# Patient Record
Sex: MALE | Race: WHITE | NOT HISPANIC OR LATINO
[De-identification: names, ages, dates, MRNs, and addresses within clinical notes are randomized per-mention and may not be internally consistent; named-entity substitution may affect disease eponyms.]

---

## 2017-11-27 ENCOUNTER — APPOINTMENT (OUTPATIENT)
Dept: SURGERY | Facility: CLINIC | Age: 52
End: 2017-11-27
Payer: MEDICAID

## 2017-11-27 VITALS — WEIGHT: 255.4 LBS | BODY MASS INDEX: 29.85 KG/M2 | HEIGHT: 77.5 IN

## 2017-11-27 PROCEDURE — 99213 OFFICE O/P EST LOW 20 MIN: CPT

## 2017-11-27 RX ORDER — ALPRAZOLAM 1 MG/1
1 TABLET ORAL DAILY
Refills: 0 | Status: ACTIVE | COMMUNITY

## 2017-11-27 RX ORDER — QUETIAPINE 400 MG/1
400 TABLET, FILM COATED ORAL
Refills: 0 | Status: ACTIVE | COMMUNITY

## 2017-12-22 ENCOUNTER — OUTPATIENT (OUTPATIENT)
Dept: OUTPATIENT SERVICES | Facility: HOSPITAL | Age: 52
LOS: 1 days | Discharge: HOME | End: 2017-12-22

## 2017-12-22 DIAGNOSIS — E66.01 MORBID (SEVERE) OBESITY DUE TO EXCESS CALORIES: ICD-10-CM

## 2017-12-22 DIAGNOSIS — G47.30 SLEEP APNEA, UNSPECIFIED: ICD-10-CM

## 2017-12-22 DIAGNOSIS — K21.9 GASTRO-ESOPHAGEAL REFLUX DISEASE WITHOUT ESOPHAGITIS: ICD-10-CM

## 2017-12-22 DIAGNOSIS — I10 ESSENTIAL (PRIMARY) HYPERTENSION: ICD-10-CM

## 2017-12-22 DIAGNOSIS — E11.9 TYPE 2 DIABETES MELLITUS WITHOUT COMPLICATIONS: ICD-10-CM

## 2017-12-22 DIAGNOSIS — K56.609 UNSPECIFIED INTESTINAL OBSTRUCTION, UNSPECIFIED AS TO PARTIAL VERSUS COMPLETE OBSTRUCTION: ICD-10-CM

## 2018-01-02 ENCOUNTER — APPOINTMENT (OUTPATIENT)
Dept: SURGERY | Facility: CLINIC | Age: 53
End: 2018-01-02
Payer: MEDICAID

## 2018-01-02 VITALS — WEIGHT: 256 LBS | HEIGHT: 76.5 IN | BODY MASS INDEX: 30.85 KG/M2

## 2018-01-02 DIAGNOSIS — F31.9 BIPOLAR DISORDER, UNSPECIFIED: ICD-10-CM

## 2018-01-02 DIAGNOSIS — K21.9 GASTRO-ESOPHAGEAL REFLUX DISEASE W/OUT ESOPHAGITIS: ICD-10-CM

## 2018-01-02 PROCEDURE — 99213 OFFICE O/P EST LOW 20 MIN: CPT

## 2018-01-02 RX ORDER — UBIDECARENONE 100 MG
100 CAPSULE ORAL
Refills: 0 | Status: ACTIVE | COMMUNITY

## 2018-01-02 RX ORDER — MULTIVITAMIN WITH IRON
TABLET ORAL DAILY
Refills: 0 | Status: ACTIVE | COMMUNITY

## 2018-01-18 ENCOUNTER — RESULT REVIEW (OUTPATIENT)
Age: 53
End: 2018-01-18

## 2018-01-23 ENCOUNTER — OUTPATIENT (OUTPATIENT)
Dept: OUTPATIENT SERVICES | Facility: HOSPITAL | Age: 53
LOS: 1 days | Discharge: HOME | End: 2018-01-23

## 2018-01-23 DIAGNOSIS — L97.411 NON-PRESSURE CHRONIC ULCER OF RIGHT HEEL AND MIDFOOT LIMITED TO BREAKDOWN OF SKIN: ICD-10-CM

## 2018-02-04 DIAGNOSIS — K56.609 UNSPECIFIED INTESTINAL OBSTRUCTION, UNSPECIFIED AS TO PARTIAL VERSUS COMPLETE OBSTRUCTION: ICD-10-CM

## 2018-02-04 DIAGNOSIS — E11.9 TYPE 2 DIABETES MELLITUS WITHOUT COMPLICATIONS: ICD-10-CM

## 2018-02-04 DIAGNOSIS — G47.30 SLEEP APNEA, UNSPECIFIED: ICD-10-CM

## 2018-02-04 DIAGNOSIS — E66.01 MORBID (SEVERE) OBESITY DUE TO EXCESS CALORIES: ICD-10-CM

## 2018-02-04 DIAGNOSIS — I10 ESSENTIAL (PRIMARY) HYPERTENSION: ICD-10-CM

## 2018-03-09 ENCOUNTER — EMERGENCY (EMERGENCY)
Facility: HOSPITAL | Age: 53
LOS: 0 days | Discharge: HOME | End: 2018-03-10

## 2018-03-09 VITALS
TEMPERATURE: 99 F | OXYGEN SATURATION: 98 % | SYSTOLIC BLOOD PRESSURE: 130 MMHG | DIASTOLIC BLOOD PRESSURE: 92 MMHG | HEART RATE: 66 BPM | RESPIRATION RATE: 17 BRPM
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
## 2018-03-09 DIAGNOSIS — Z98.84 BARIATRIC SURGERY STATUS: ICD-10-CM

## 2018-03-09 DIAGNOSIS — R25.1 TREMOR, UNSPECIFIED: ICD-10-CM
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
## 2018-03-09 DIAGNOSIS — F31.9 BIPOLAR DISORDER, UNSPECIFIED: ICD-10-CM
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
## 2018-03-10 VITALS
DIASTOLIC BLOOD PRESSURE: 90 MMHG | RESPIRATION RATE: 17 BRPM | HEART RATE: 64 BPM | SYSTOLIC BLOOD PRESSURE: 138 MMHG | OXYGEN SATURATION: 98 %

## 2018-03-10 DIAGNOSIS — Z98.890 OTHER SPECIFIED POSTPROCEDURAL STATES: Chronic | ICD-10-CM

## 2018-03-10 NOTE — ED PROVIDER NOTE - PHYSICAL EXAMINATION
Constitutional: Well developed, well nourished. NAD  Head: Normocephalic, atraumatic.  Eyes: PERRL, EOMI.  ENT: No nasal discharge. Mucous membranes dry.  Neck: Supple. Painless ROM.  Cardiovascular: Normal S1, S2. Regular rate and rhythm. No murmurs, rubs, or gallops.  Pulmonary: Normal respiratory rate and effort. Lungs clear to auscultation bilaterally. No wheezing, rales, or rhonchi.  Abdominal: Soft. Nondistended. No rebound, guarding, rigidity.  Extremities. Pelvis stable. No lower extremity edema, symmetric calves.  Skin: No rashes, cyanosis.  Neuro: AAOx3. No focal neurological deficits.  Psych: Normal mood. Normal affect.

## 2018-03-10 NOTE — ED PROVIDER NOTE - NS ED ROS FT
Constitutional: No fever, chills. + generalized shaking after incident resolved now  Eyes: No visual changes.  ENT: No hearing changes.  Neck: No neck pain or stiffness.  Cardiovascular: No chest pain, palpitations, edema.  Pulmonary: No SOB, cough. No hemoptysis.  Abdominal:  No nausea, vomiting, diarrhea.  : No dysuria, frequency.  Neuro: No headache, syncope, dizziness.  MS: No back pain. No calf pain/swelling.  Psych: No suicidal ideations.
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
## 2018-10-01 ENCOUNTER — APPOINTMENT (OUTPATIENT)
Dept: SURGERY | Facility: CLINIC | Age: 53
End: 2018-10-01

## 2018-10-24 ENCOUNTER — OTHER (OUTPATIENT)
Age: 53
End: 2018-10-24

## 2018-11-16 ENCOUNTER — OTHER (OUTPATIENT)
Age: 53
End: 2018-11-16

## 2018-12-11 ENCOUNTER — OTHER (OUTPATIENT)
Age: 53
End: 2018-12-11

## 2019-01-17 ENCOUNTER — INPATIENT (INPATIENT)
Facility: HOSPITAL | Age: 54
LOS: 1 days | Discharge: HOME HEALTH | End: 2019-01-19
Attending: INTERNAL MEDICINE | Admitting: INTERNAL MEDICINE

## 2019-01-17 VITALS
OXYGEN SATURATION: 94 % | TEMPERATURE: 99 F | HEART RATE: 99 BPM | SYSTOLIC BLOOD PRESSURE: 126 MMHG | DIASTOLIC BLOOD PRESSURE: 80 MMHG | RESPIRATION RATE: 20 BRPM

## 2019-01-17 DIAGNOSIS — J98.11 ATELECTASIS: ICD-10-CM

## 2019-01-17 DIAGNOSIS — Z96.642 PRESENCE OF LEFT ARTIFICIAL HIP JOINT: ICD-10-CM

## 2019-01-17 DIAGNOSIS — R09.02 HYPOXEMIA: ICD-10-CM

## 2019-01-17 DIAGNOSIS — I10 ESSENTIAL (PRIMARY) HYPERTENSION: ICD-10-CM

## 2019-01-17 DIAGNOSIS — F31.9 BIPOLAR DISORDER, UNSPECIFIED: ICD-10-CM

## 2019-01-17 DIAGNOSIS — Z96.642 PRESENCE OF LEFT ARTIFICIAL HIP JOINT: Chronic | ICD-10-CM

## 2019-01-17 DIAGNOSIS — K21.9 GASTRO-ESOPHAGEAL REFLUX DISEASE WITHOUT ESOPHAGITIS: ICD-10-CM

## 2019-01-17 DIAGNOSIS — Z98.890 OTHER SPECIFIED POSTPROCEDURAL STATES: Chronic | ICD-10-CM

## 2019-01-17 DIAGNOSIS — R06.02 SHORTNESS OF BREATH: ICD-10-CM

## 2019-01-17 LAB
ANION GAP SERPL CALC-SCNC: 11 MMOL/L — SIGNIFICANT CHANGE UP (ref 7–14)
APPEARANCE UR: CLEAR — SIGNIFICANT CHANGE UP
APTT BLD: 29.1 SEC — SIGNIFICANT CHANGE UP (ref 27–39.2)
BASOPHILS # BLD AUTO: 0.04 K/UL — SIGNIFICANT CHANGE UP (ref 0–0.2)
BASOPHILS NFR BLD AUTO: 0.7 % — SIGNIFICANT CHANGE UP (ref 0–1)
BILIRUB UR-MCNC: NEGATIVE — SIGNIFICANT CHANGE UP
BUN SERPL-MCNC: 12 MG/DL — SIGNIFICANT CHANGE UP (ref 10–20)
CALCIUM SERPL-MCNC: 8.6 MG/DL — SIGNIFICANT CHANGE UP (ref 8.5–10.1)
CHLORIDE SERPL-SCNC: 102 MMOL/L — SIGNIFICANT CHANGE UP (ref 98–110)
CO2 SERPL-SCNC: 29 MMOL/L — SIGNIFICANT CHANGE UP (ref 17–32)
COLOR SPEC: YELLOW — SIGNIFICANT CHANGE UP
CREAT SERPL-MCNC: 0.9 MG/DL — SIGNIFICANT CHANGE UP (ref 0.7–1.5)
DIFF PNL FLD: NEGATIVE — SIGNIFICANT CHANGE UP
EOSINOPHIL # BLD AUTO: 0.1 K/UL — SIGNIFICANT CHANGE UP (ref 0–0.7)
EOSINOPHIL NFR BLD AUTO: 1.8 % — SIGNIFICANT CHANGE UP (ref 0–8)
GLUCOSE BLDC GLUCOMTR-MCNC: 101 MG/DL — HIGH (ref 70–99)
GLUCOSE SERPL-MCNC: 129 MG/DL — HIGH (ref 70–99)
GLUCOSE UR QL: NEGATIVE MG/DL — SIGNIFICANT CHANGE UP
HCT VFR BLD CALC: 40.4 % — LOW (ref 42–52)
HGB BLD-MCNC: 13.5 G/DL — LOW (ref 14–18)
IMM GRANULOCYTES NFR BLD AUTO: 0.4 % — HIGH (ref 0.1–0.3)
INR BLD: 1.13 RATIO — SIGNIFICANT CHANGE UP (ref 0.65–1.3)
KETONES UR-MCNC: NEGATIVE — SIGNIFICANT CHANGE UP
LACTATE SERPL-SCNC: 1.8 MMOL/L — SIGNIFICANT CHANGE UP (ref 0.5–2.2)
LEUKOCYTE ESTERASE UR-ACNC: NEGATIVE — SIGNIFICANT CHANGE UP
LYMPHOCYTES # BLD AUTO: 0.83 K/UL — LOW (ref 1.2–3.4)
LYMPHOCYTES # BLD AUTO: 14.8 % — LOW (ref 20.5–51.1)
MCHC RBC-ENTMCNC: 31.3 PG — HIGH (ref 27–31)
MCHC RBC-ENTMCNC: 33.4 G/DL — SIGNIFICANT CHANGE UP (ref 32–37)
MCV RBC AUTO: 93.7 FL — SIGNIFICANT CHANGE UP (ref 80–94)
MONOCYTES # BLD AUTO: 0.38 K/UL — SIGNIFICANT CHANGE UP (ref 0.1–0.6)
MONOCYTES NFR BLD AUTO: 6.8 % — SIGNIFICANT CHANGE UP (ref 1.7–9.3)
NEUTROPHILS # BLD AUTO: 4.23 K/UL — SIGNIFICANT CHANGE UP (ref 1.4–6.5)
NEUTROPHILS NFR BLD AUTO: 75.5 % — HIGH (ref 42.2–75.2)
NITRITE UR-MCNC: NEGATIVE — SIGNIFICANT CHANGE UP
NRBC # BLD: 0 /100 WBCS — SIGNIFICANT CHANGE UP (ref 0–0)
NT-PROBNP SERPL-SCNC: 88 PG/ML — SIGNIFICANT CHANGE UP (ref 0–300)
PH UR: 6 — SIGNIFICANT CHANGE UP (ref 5–8)
PLATELET # BLD AUTO: 172 K/UL — SIGNIFICANT CHANGE UP (ref 130–400)
POTASSIUM SERPL-MCNC: 4.4 MMOL/L — SIGNIFICANT CHANGE UP (ref 3.5–5)
POTASSIUM SERPL-SCNC: 4.4 MMOL/L — SIGNIFICANT CHANGE UP (ref 3.5–5)
PROT UR-MCNC: NEGATIVE MG/DL — SIGNIFICANT CHANGE UP
PROTHROM AB SERPL-ACNC: 13 SEC — HIGH (ref 9.95–12.87)
RBC # BLD: 4.31 M/UL — LOW (ref 4.7–6.1)
RBC # FLD: 12.6 % — SIGNIFICANT CHANGE UP (ref 11.5–14.5)
SODIUM SERPL-SCNC: 142 MMOL/L — SIGNIFICANT CHANGE UP (ref 135–146)
SP GR SPEC: 1.01 — SIGNIFICANT CHANGE UP (ref 1.01–1.03)
TROPONIN T SERPL-MCNC: <0.01 NG/ML — SIGNIFICANT CHANGE UP
UROBILINOGEN FLD QL: 1 MG/DL (ref 0.2–0.2)
WBC # BLD: 5.6 K/UL — SIGNIFICANT CHANGE UP (ref 4.8–10.8)
WBC # FLD AUTO: 5.6 K/UL — SIGNIFICANT CHANGE UP (ref 4.8–10.8)

## 2019-01-17 RX ORDER — QUETIAPINE FUMARATE 200 MG/1
300 TABLET, FILM COATED ORAL AT BEDTIME
Qty: 0 | Refills: 0 | Status: DISCONTINUED | OUTPATIENT
Start: 2019-01-17 | End: 2019-01-19

## 2019-01-17 RX ORDER — VANCOMYCIN HCL 1 G
1500 VIAL (EA) INTRAVENOUS ONCE
Qty: 0 | Refills: 0 | Status: COMPLETED | OUTPATIENT
Start: 2019-01-17 | End: 2019-01-17

## 2019-01-17 RX ORDER — SODIUM CHLORIDE 9 MG/ML
3 INJECTION INTRAMUSCULAR; INTRAVENOUS; SUBCUTANEOUS ONCE
Qty: 0 | Refills: 0 | Status: COMPLETED | OUTPATIENT
Start: 2019-01-17 | End: 2019-01-17

## 2019-01-17 RX ORDER — QUETIAPINE FUMARATE 200 MG/1
0 TABLET, FILM COATED ORAL
Qty: 0 | Refills: 0 | COMMUNITY

## 2019-01-17 RX ORDER — PANTOPRAZOLE SODIUM 20 MG/1
40 TABLET, DELAYED RELEASE ORAL
Qty: 0 | Refills: 0 | Status: DISCONTINUED | OUTPATIENT
Start: 2019-01-17 | End: 2019-01-19

## 2019-01-17 RX ORDER — CHLORHEXIDINE GLUCONATE 213 G/1000ML
1 SOLUTION TOPICAL
Qty: 0 | Refills: 0 | Status: DISCONTINUED | OUTPATIENT
Start: 2019-01-17 | End: 2019-01-19

## 2019-01-17 RX ORDER — FUROSEMIDE 40 MG
20 TABLET ORAL DAILY
Qty: 0 | Refills: 0 | Status: DISCONTINUED | OUTPATIENT
Start: 2019-01-17 | End: 2019-01-19

## 2019-01-17 RX ORDER — VANCOMYCIN HCL 1 G
1500 VIAL (EA) INTRAVENOUS EVERY 12 HOURS
Qty: 0 | Refills: 0 | Status: DISCONTINUED | OUTPATIENT
Start: 2019-01-18 | End: 2019-01-19

## 2019-01-17 RX ORDER — ASPIRIN/CALCIUM CARB/MAGNESIUM 324 MG
1 TABLET ORAL
Qty: 0 | Refills: 0 | COMMUNITY

## 2019-01-17 RX ORDER — CEFEPIME 1 G/1
2000 INJECTION, POWDER, FOR SOLUTION INTRAMUSCULAR; INTRAVENOUS EVERY 8 HOURS
Qty: 0 | Refills: 0 | Status: DISCONTINUED | OUTPATIENT
Start: 2019-01-17 | End: 2019-01-19

## 2019-01-17 RX ORDER — ENOXAPARIN SODIUM 100 MG/ML
40 INJECTION SUBCUTANEOUS EVERY 24 HOURS
Qty: 0 | Refills: 0 | Status: DISCONTINUED | OUTPATIENT
Start: 2019-01-17 | End: 2019-01-19

## 2019-01-17 RX ORDER — TRAMADOL HYDROCHLORIDE 50 MG/1
25 TABLET ORAL THREE TIMES A DAY
Qty: 0 | Refills: 0 | Status: DISCONTINUED | OUTPATIENT
Start: 2019-01-17 | End: 2019-01-19

## 2019-01-17 RX ORDER — ASPIRIN/CALCIUM CARB/MAGNESIUM 324 MG
81 TABLET ORAL
Qty: 0 | Refills: 0 | Status: DISCONTINUED | OUTPATIENT
Start: 2019-01-17 | End: 2019-01-19

## 2019-01-17 RX ORDER — VANCOMYCIN HCL 1 G
VIAL (EA) INTRAVENOUS
Qty: 0 | Refills: 0 | Status: DISCONTINUED | OUTPATIENT
Start: 2019-01-17 | End: 2019-01-19

## 2019-01-17 RX ORDER — ACETAMINOPHEN 500 MG
975 TABLET ORAL ONCE
Qty: 0 | Refills: 0 | Status: COMPLETED | OUTPATIENT
Start: 2019-01-17 | End: 2019-01-17

## 2019-01-17 RX ORDER — CEFEPIME 1 G/1
2000 INJECTION, POWDER, FOR SOLUTION INTRAMUSCULAR; INTRAVENOUS ONCE
Qty: 0 | Refills: 0 | Status: COMPLETED | OUTPATIENT
Start: 2019-01-17 | End: 2019-01-17

## 2019-01-17 RX ORDER — ACETAMINOPHEN 500 MG
650 TABLET ORAL EVERY 6 HOURS
Qty: 0 | Refills: 0 | Status: DISCONTINUED | OUTPATIENT
Start: 2019-01-17 | End: 2019-01-19

## 2019-01-17 RX ADMIN — SODIUM CHLORIDE 3 MILLILITER(S): 9 INJECTION INTRAMUSCULAR; INTRAVENOUS; SUBCUTANEOUS at 10:57

## 2019-01-17 RX ADMIN — CEFEPIME 100 MILLIGRAM(S): 1 INJECTION, POWDER, FOR SOLUTION INTRAMUSCULAR; INTRAVENOUS at 11:18

## 2019-01-17 RX ADMIN — Medication 975 MILLIGRAM(S): at 11:18

## 2019-01-17 RX ADMIN — Medication 20 MILLIGRAM(S): at 15:09

## 2019-01-17 RX ADMIN — CEFEPIME 100 MILLIGRAM(S): 1 INJECTION, POWDER, FOR SOLUTION INTRAMUSCULAR; INTRAVENOUS at 22:18

## 2019-01-17 RX ADMIN — Medication 81 MILLIGRAM(S): at 17:06

## 2019-01-17 RX ADMIN — QUETIAPINE FUMARATE 300 MILLIGRAM(S): 200 TABLET, FILM COATED ORAL at 21:47

## 2019-01-17 RX ADMIN — Medication 975 MILLIGRAM(S): at 14:12

## 2019-01-17 RX ADMIN — Medication 500 MILLIGRAM(S): at 12:50

## 2019-01-17 RX ADMIN — ENOXAPARIN SODIUM 40 MILLIGRAM(S): 100 INJECTION SUBCUTANEOUS at 15:08

## 2019-01-17 NOTE — ED ADULT NURSE REASSESSMENT NOTE - NS ED NURSE REASSESS COMMENT FT1
Pt returned from CT with increased chills, temp taken orally 101.7F, MD Hall made aware and will order PO tylenol. Will reassess.

## 2019-01-17 NOTE — ED PROVIDER NOTE - OBJECTIVE STATEMENT
52 y/o male h/o HTN, Bipolar d/o, GERD, s/p umbilical hernia repair, Gregg-en-Y at [facility] 2013, hip replacement yesterday at [facility] (Dr. Hardeep Welch), now presents wit SOB since waking at 0530 AM. Sx are constant, now slightly improved but not resolved, denies radiation, worse with exertion and lying down, better at rest and sitting up, associated cough, states is compliant with home medications, denies fever, hemoptysis, PND, chest pain, LE pain or swelling, recent travel or other associated complaints at present. Pt reports similar sx previously with his GERD but usually respond to sx treatment at home and this episode did not resolve. 54 y/o male h/o HTN, Bipolar d/o, GERD, s/p umbilical hernia repair, Gregg-en-Y at [facility] 2013, left hip replacement yesterday at [facility] (Dr. Hardeep Welch), now presents wit SOB since waking at 0530 AM. Sx are constant, now slightly improved but not resolved, denies radiation, worse with exertion and lying down, better at rest and sitting up, associated cough, states is compliant with home medications, denies fever, hemoptysis, PND, chest pain, LE pain or swelling, recent travel or other associated complaints at present. Pt reports similar sx previously with his GERD but usually respond to sx treatment at home and this episode did not resolve.

## 2019-01-17 NOTE — H&P ADULT - ATTENDING COMMENTS
HPI as above.  Pt seen and examined at bedside independently  Vitals stable  Exam stable  Labs reviewed  EKG reviewed  Imaging reviewed  Plan continue current management as above with edits. Addendums by Attending in bold and italics  #Handoff:  Pending: ID consult, fu resp antonette  Family discussion: mag pt at bedside   Dispo: home once medically cleared

## 2019-01-17 NOTE — ED PROVIDER NOTE - CARE PLAN
Principal Discharge DX:	Pneumonia  Secondary Diagnosis:	Hypoxia  Secondary Diagnosis:	Shortness of breath

## 2019-01-17 NOTE — H&P ADULT - NSHPLABSRESULTS_GEN_ALL_CORE
13.5   5.60  )-----------( 172      ( 2019 07:33 )             40.4           142  |  102  |  12  ----------------------------<  129<H>  4.4   |  29  |  0.9    Ca    8.6      2019 07:33          Urinalysis Basic - ( 2019 08:41 )    Color: Yellow / Appearance: Clear / S.010 / pH: x  Gluc: x / Ketone: Negative  / Bili: Negative / Urobili: 1.0 mg/dL   Blood: x / Protein: Negative mg/dL / Nitrite: Negative   Leuk Esterase: Negative / RBC: x / WBC x   Sq Epi: x / Non Sq Epi: x / Bacteria: x        PT/INR - ( 2019 07:33 )   PT: 13.00 sec;   INR: 1.13 ratio         PTT - ( 2019 07:33 )  PTT:29.1 sec    Lactate Trend   @ 07:33 Lactate:1.8       CARDIAC MARKERS ( 2019 07:33 )  x     / <0.01 ng/mL / x     / x     / x          < from: CT Angio Chest w/ IV Cont (19 @ 10:00) >    IMPRESSION:  No pulmonary emboli.    Bilateral groundglass nodular opacities, with lower lobe predominance.    Findings are likely infectious/postinflammatory in etiology.     Prominent mediastinal lymph nodes, likely reactive in nature.    < end of copied text >    < from: Xray Chest 1 View AP/PA (19 @ 08:06) >    Impression:      No radiographic evidence of acute cardiopulmonary disease.    < end of copied text >

## 2019-01-17 NOTE — ED PROVIDER NOTE - PHYSICAL EXAMINATION
VSS, awake, alert, non-toxic appearing, sitting comfortably, in NAD, ears clear, oropharynx clear, no skin rash or lesions, no tracheal deviation, non-labored breathing without accessory muscle use apparent or pursed lip breathing, no retractions, speaks full sentences, chest CTAB, no w/r/r, +S1/S2, RRR, no m/r/g, abdomen soft, NT, ND, +BS, No peripheral edema or ttp, AO x 3, clear speech.

## 2019-01-17 NOTE — ED PROVIDER NOTE - PROGRESS NOTE DETAILS
Pt's orthopedist paged, awaiting call back, pt notified of results prefers to be admitted to [facility], mildly hypoxic, responsive to oxygen, febrile, CT c/w early pna and pt immobile due to recent surgery.
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
## 2019-01-17 NOTE — ED PROVIDER NOTE - NS ED ROS FT
Constitutional: No fever, chills.  Eyes: No visual changes, eye pain or discharge.  ENMT: No hearing changes, pain, discharge or infections.  Respiratory: No respiratory distress.   GI: No nausea, vomiting, diarrhea or abdominal pain.  : No dysuria, frequency or burning.  MS: No myalgia, muscle weakness, joint pain or back pain.  Neuro: No headache or weakness. No LOC.  Skin: No skin rash.   Except as documented in the HPI, all other systems are negative.

## 2019-01-17 NOTE — H&P ADULT - NSHPREVIEWOFSYSTEMS_GEN_ALL_CORE
REVIEW OF SYSTEMS:    CONSTITUTIONAL: No weakness, fevers or chills  EYES/ENT: No visual changes;  No vertigo or throat pain   NECK: No pain or stiffness  RESPIRATORY: See HPI  CARDIOVASCULAR: No chest pain or palpitations  GASTROINTESTINAL: No abdominal or epigastric pain. No nausea, vomiting, or hematemesis; No diarrhea or constipation. No melena or hematochezia.  GENITOURINARY: No dysuria, frequency or hematuria  NEUROLOGICAL: No numbness or weakness  MUSCULOSKELETAL: No muscle or joint pain, stiffness, or swelling  SKIN: No itching, rashes

## 2019-01-17 NOTE — H&P ADULT - HISTORY OF PRESENT ILLNESS
This is a 52 YO M, PMH of HTN, Bipolar d/o, GERD, s/p umbilical hernia repair, Gregg-en-Y at [facility] 2013, left hip replacement yesterday at [facility] (Dr. Hardeep Welch), now presents wit SOB since waking at 0530 AM    Vital Signs Last 24 Hrs  T(C): 38.7 (17 Jan 2019 10:50), Max: 38.7 (17 Jan 2019 10:50)  T(F): 101.7 (17 Jan 2019 10:50), Max: 101.7 (17 Jan 2019 10:50)  HR: 93 (17 Jan 2019 07:55) (80 - 99)  BP: 126/80 (17 Jan 2019 07:09) (126/80 - 126/80)  BP(mean): --  RR: 20 (17 Jan 2019 07:55) (20 - 20)  SpO2: 97% (17 Jan 2019 07:55) (93% - 97%) This is a 52 YO M, PMH of HTN, Bipolar d/o, GERD, s/p umbilical hernia repair, Gregg-en-Y at [facility] 2013, left hip replacement yesterday at [facility] (Dr. Hardeep Welch), now presents with cough and SOB since waking at 0530 AM. Denies chest pain, N/V/D, abdominal pain, urinary symptoms, LE pain/swelling other than hip pain 2/2 surgery. In the ED was found to have fever, was hypoxic on RA.     Vital Signs Last 24 Hrs  T(C): 38.7 (17 Jan 2019 10:50), Max: 38.7 (17 Jan 2019 10:50)  T(F): 101.7 (17 Jan 2019 10:50), Max: 101.7 (17 Jan 2019 10:50)  HR: 93 (17 Jan 2019 07:55) (80 - 99)  BP: 126/80 (17 Jan 2019 07:09) (126/80 - 126/80)  BP(mean): --  RR: 20 (17 Jan 2019 07:55) (20 - 20)  SpO2: 97% (17 Jan 2019 07:55) (93% - 97%)

## 2019-01-17 NOTE — ED ADULT NURSE NOTE - NSFALLRSKHRMRISKTYPE_ED_ALL_ED
surgery(72hr postop, recent leg amputee, sig. abd/thor surg)/bones(Osteoporosis,prev fx,steroid use,metastatic bone ca)/other

## 2019-01-17 NOTE — ED ADULT NURSE NOTE - NSIMPLEMENTINTERV_GEN_ALL_ED
Implemented All Fall with Harm Risk Interventions:  [place] to call system. Call bell, personal items and telephone within reach. Instruct patient to call for assistance. Room bathroom lighting operational. Non-slip footwear when patient is off stretcher. Physically safe environment: no spills, clutter or unnecessary equipment. Stretcher in lowest position, wheels locked, appropriate side rails in place. Provide visual cue, wrist band, yellow gown, etc. Monitor gait and stability. Monitor for mental status changes and reorient to person, place, and time. Review medications for side effects contributing to fall risk. Reinforce activity limits and safety measures with patient and family. Provide visual clues: red socks.

## 2019-01-17 NOTE — H&P ADULT - ASSESSMENT
53 M with PMH HTN, GERD, bipolar Do presents with cough and SOB after hip replacement surgery. Was febrile and hypoxic in the ED, found to have opacity on CT chest as above. Will treat for HCAP given recent hospitalization and surgery.    1) Fever, cough, SOB likely 2/2 pneumonia  - O2 via NC PRN  - c/w broad spectrum ABx for now  - FU strep, MRSA, Legionella ag  - FU ID c/s    2) HTN  - c/w Lasix 20mg daily (home med)    3) Bipolar:  - c/w seroquel 300mg at bedtime    4) Recent hip replacement  - pain control with tramadol    PPx/Diet/Dispo  On ASA BID from ortho and SCDs  DASH diet  Full code, from home 53 M with PMH HTN, GERD, bipolar Do presents with cough and SOB after hip replacement surgery. Was febrile and hypoxic in the ED, found to have opacity on CT chest as above. Will treat for HCAP given recent hospitalization and surgery.    1) Fever, cough, SOB likely 2/2 suspected gram negative pneumonia  -Lobar opacity seen on Bilateral groundglass nodular opacities  - O2 via NC PRN  - c/w broad spectrum ABx for now  - FU strep, MRSA, Legionella ag  - FU ID c/s    2) HTN  - c/w Lasix 20mg daily (home med)    3) Bipolar:  - c/w seroquel 300mg at bedtime    4) Recent hip replacement  - pain control with tramadol    PPx/Diet/Dispo  On ASA BID from ortho and SCDs  DASH diet  Full code, from home

## 2019-01-18 RX ADMIN — CEFEPIME 100 MILLIGRAM(S): 1 INJECTION, POWDER, FOR SOLUTION INTRAMUSCULAR; INTRAVENOUS at 05:21

## 2019-01-18 RX ADMIN — QUETIAPINE FUMARATE 300 MILLIGRAM(S): 200 TABLET, FILM COATED ORAL at 21:52

## 2019-01-18 RX ADMIN — Medication 250 MILLIGRAM(S): at 17:25

## 2019-01-18 RX ADMIN — Medication 81 MILLIGRAM(S): at 17:25

## 2019-01-18 RX ADMIN — PANTOPRAZOLE SODIUM 40 MILLIGRAM(S): 20 TABLET, DELAYED RELEASE ORAL at 05:22

## 2019-01-18 RX ADMIN — CHLORHEXIDINE GLUCONATE 1 APPLICATION(S): 213 SOLUTION TOPICAL at 05:24

## 2019-01-18 RX ADMIN — CEFEPIME 100 MILLIGRAM(S): 1 INJECTION, POWDER, FOR SOLUTION INTRAMUSCULAR; INTRAVENOUS at 21:52

## 2019-01-18 RX ADMIN — Medication 81 MILLIGRAM(S): at 06:06

## 2019-01-18 RX ADMIN — Medication 20 MILLIGRAM(S): at 05:22

## 2019-01-18 RX ADMIN — Medication 75 MILLIGRAM(S): at 17:25

## 2019-01-18 RX ADMIN — CEFEPIME 100 MILLIGRAM(S): 1 INJECTION, POWDER, FOR SOLUTION INTRAMUSCULAR; INTRAVENOUS at 14:00

## 2019-01-18 RX ADMIN — Medication 75 MILLIGRAM(S): at 11:48

## 2019-01-18 RX ADMIN — ENOXAPARIN SODIUM 40 MILLIGRAM(S): 100 INJECTION SUBCUTANEOUS at 14:00

## 2019-01-18 RX ADMIN — Medication 250 MILLIGRAM(S): at 05:21

## 2019-01-18 NOTE — PROGRESS NOTE ADULT - SUBJECTIVE AND OBJECTIVE BOX
SUBJECTIVE:    Patient is a 53y old Male who presents with a chief complaint of SOB (2019 16:06)    Stable, no acute events.    PAST MEDICAL & SURGICAL HISTORY  Hypertension  GERD (gastroesophageal reflux disease)  Bipolar disorder  Status post left hip replacement: 1/15/19  History of gastric surgery       ALLERGIES:  No Known Allergies    MEDICATIONS:  STANDING MEDICATIONS  aspirin  chewable 81 milliGRAM(s) Oral two times a day  cefepime   IVPB 2000 milliGRAM(s) IV Intermittent every 8 hours  chlorhexidine 4% Liquid 1 Application(s) Topical <User Schedule>  enoxaparin Injectable 40 milliGRAM(s) SubCutaneous every 24 hours  furosemide    Tablet 20 milliGRAM(s) Oral daily  levoFLOXacin IVPB      levoFLOXacin IVPB 750 milliGRAM(s) IV Intermittent every 24 hours  oseltamivir 75 milliGRAM(s) Oral two times a day  pantoprazole    Tablet 40 milliGRAM(s) Oral before breakfast  QUEtiapine 300 milliGRAM(s) Oral at bedtime  vancomycin  IVPB      vancomycin  IVPB 1500 milliGRAM(s) IV Intermittent every 12 hours    PRN MEDICATIONS  acetaminophen   Tablet .. 650 milliGRAM(s) Oral every 6 hours PRN  traMADol 25 milliGRAM(s) Oral three times a day PRN    VITALS:   T(F): 96.9  HR: 99  BP: 121/71  RR: 18  SpO2: --    LABS:                        13.5   5.60  )-----------( 172      ( 2019 07:33 )             40.4     01-    142  |  102  |  12  ----------------------------<  129<H>  4.4   |  29  |  0.9    Ca    8.6      2019 07:33      PT/INR - ( 2019 07:33 )   PT: 13.00 sec;   INR: 1.13 ratio         PTT - ( 2019 07:33 )  PTT:29.1 sec  Urinalysis Basic - ( 2019 08:41 )    Color: Yellow / Appearance: Clear / S.010 / pH: x  Gluc: x / Ketone: Negative  / Bili: Negative / Urobili: 1.0 mg/dL   Blood: x / Protein: Negative mg/dL / Nitrite: Negative   Leuk Esterase: Negative / RBC: x / WBC x   Sq Epi: x / Non Sq Epi: x / Bacteria: x            CARDIAC MARKERS ( 2019 07:33 )  x     / <0.01 ng/mL / x     / x     / x                  PHYSICAL EXAM:  GEN: NAD, comfortable  LUNGS: CTAB, no w/r/r  HEART: RRR, no m/r/g  ABD: soft, NT/ND, +BS  EXT: no edema, PP b/l  NEURO: AAOx3

## 2019-01-18 NOTE — PROGRESS NOTE ADULT - ASSESSMENT
53 M with PMH HTN, GERD, bipolar Do presents with cough and SOB after hip replacement surgery. Was febrile and hypoxic in the ED, found to have opacity on CT chest as above. Will treat for HCAP given recent hospitalization and surgery.    1) Fever, cough, SOB likely 2/2 suspected gram negative pneumonia  -Lobar opacity seen on Bilateral groundglass nodular opacities  - O2 via NC PRN  - c/w broad spectrum ABx for now  - FU strep, MRSA, Legionella ag  - FU ID c/s    2) HTN  - c/w Lasix 20mg daily (home med)    3) Bipolar:  - c/w seroquel 300mg at bedtime    4) Recent hip replacement  - pain control with tramadol    PPx/Diet/Dispo  On ASA BID from ortho and SCDs  DASH diet  Full code, from home        Pending: ID consult    Since patient is consulted with ID, Will follow up. Expecting D/C over the weekend.

## 2019-01-18 NOTE — PROGRESS NOTE ADULT - ASSESSMENT
1) Fever, cough, SOB possible pneumonia vs. URTI  - O2 via NC PRN  - c/w broad spectrum ABx for now  - FU strep, MRSA, Legionella ag  - Vanc trough in AM  - FU ID c/s    2) HTN  - c/w Lasix 20mg daily (home med)    3) Bipolar:  - c/w seroquel 300mg at bedtime    4) Recent hip replacement  - pain control with tramadol    PPx/Diet/Dispo  On ASA BID from ortho and SCDs  DASH diet  Full code, from home

## 2019-01-18 NOTE — PROGRESS NOTE ADULT - SUBJECTIVE AND OBJECTIVE BOX
PROGRESS NOTE  Chief Complaint:  Patient is a 53y old  Male who presents with a chief complaint of SOB (2019 11:20)      HPI:This is a 52 YO M, PMH of HTN, Bipolar d/o, GERD, s/p umbilical hernia repair, Gregg-en-Y at [facility] , left hip replacement yesterday at [facility] (Dr. Hardeep Welch), now presents with cough and SOB since waking at 0530 AM. Denies chest pain, N/V/D, abdominal pain, urinary symptoms, LE pain/swelling other than hip pain 2/2 surgery. In the ED was found to have fever, was hypoxic on RA.     Vital Signs Last 24 Hrs  T(C): 38.7 (2019 10:50), Max: 38.7 (2019 10:50)    T(F): 101.7 (2019 10:50), Max: 101.7 (2019 10:50)  HR: 93 (2019 07:55) (80 - 99)  BP: 126/80 (2019 07:09) (126/80 - 126/80)  BP(mean): --  RR: 20 (2019 07:55) (20 - 20)  SpO2: 97% (2019 07:55) (93% - 97%) (2019 11:50)      ALLERGIES:  No Known Allergies      HOSPITAL MEDICATIONS:  MEDICATIONS  (STANDING):  aspirin  chewable 81 milliGRAM(s) Oral two times a day  cefepime   IVPB 2000 milliGRAM(s) IV Intermittent every 8 hours  chlorhexidine 4% Liquid 1 Application(s) Topical <User Schedule>  enoxaparin Injectable 40 milliGRAM(s) SubCutaneous every 24 hours  furosemide    Tablet 20 milliGRAM(s) Oral daily  levoFLOXacin IVPB      levoFLOXacin IVPB 750 milliGRAM(s) IV Intermittent every 24 hours  oseltamivir 75 milliGRAM(s) Oral two times a day  pantoprazole    Tablet 40 milliGRAM(s) Oral before breakfast  QUEtiapine 300 milliGRAM(s) Oral at bedtime  vancomycin  IVPB      vancomycin  IVPB 1500 milliGRAM(s) IV Intermittent every 12 hours    MEDICATIONS  (PRN):  acetaminophen   Tablet .. 650 milliGRAM(s) Oral every 6 hours PRN Temp greater or equal to 38.5C (101.3F)  traMADol 25 milliGRAM(s) Oral three times a day PRN Moderate Pain (4 - 6)      PMHX/PSHX:  Hypertension  GERD (gastroesophageal reflux disease)  Bipolar disorder  Status post left hip replacement  History of gastric surgery      FAMILY HISTORY:  No pertinent family history in first degree relatives      SOCIAL HISTORY:    REVIEW OF SYSTEMS:     General:  No wt loss, fevers, chills, night sweats, fatigue,   Eyes:  Good vision, no reported pain  ENT:  No sore throat, pain, runny nose, dysphagia  CV:  No pain, palpitations, hypo/hypertension  Resp:  No dyspnea, cough, tachypnea, wheezing  GI:  No pain, No nausea, No vomiting, No diarrhea, No constipation, No weight loss, No fever, No pruritis, No rectal bleeding, No tarry stools, No dysphagia,  :  No pain, bleeding, incontinence, nocturia  Muscle:  No pain, weakness  Neuro:  No weakness, tingling, memory problems  Psych:  No fatigue, insomnia, mood problems, depression  Endocrine:  No polyuria, polydipsia, cold/heat intolerance  Heme:  No petechiae, ecchymosis, easy bruisability  Skin:  No rash, tattoos, scars, edema      PHYSICAL EXAM:   Vital Signs:  Vital Signs Last 24 Hrs  T(C): 36.1 (2019 13:22), Max: 36.6 (2019 22:00)  T(F): 96.9 (2019 13:22), Max: 97.9 (2019 22:00)  HR: 99 (2019 13:22) (58 - 99)  BP: 121/71 (2019 13:22) (115/67 - 130/71)  BP(mean): --  RR: 18 (2019 13:22) (18 - 18)  SpO2: 95% (2019 19:45) (95% - 95%)  Daily Height in cm: 193.04 (2019 11:55)    Daily     GENERAL:  Appears stated age, well-groomed, well-nourished, no distress  HEENT:  NC/AT,  conjunctivae clear and pink, no thyromegaly, nodules, adenopathy, no JVD, sclera -anicteric  CHEST:  Full & symmetric excursion, no increased effort, breath sounds clear  HEART:  Regular rhythm, S1, S2, no murmur/rub/S3/S4, no abdominal bruit, no edema  ABDOMEN:  Soft, non-tender, non-distended, normoactive bowel sounds,  no masses ,no hepato-splenomegaly, no signs of chronic liver disease  EXTEREMITIES:  no cyanosis,clubbing or edema  SKIN:  No rash/erythema/ecchymoses/petechiae/wounds/abscess/warm/dry  NEURO:  Alert, oriented, no asterixis, no tremor, no encephalopathy    LABS:                        13.5   5.60  )-----------( 172      ( 2019 07:33 )             40.4     01-    142  |  102  |  12  ----------------------------<  129<H>  4.4   |  29  |  0.9    Ca    8.6      2019 07:33        PT/INR - ( 2019 07:33 )   PT: 13.00 sec;   INR: 1.13 ratio         PTT - ( 2019 07:33 )  PTT:29.1 sec  Urinalysis Basic - ( 2019 08:41 )    Color: Yellow / Appearance: Clear / S.010 / pH: x  Gluc: x / Ketone: Negative  / Bili: Negative / Urobili: 1.0 mg/dL   Blood: x / Protein: Negative mg/dL / Nitrite: Negative   Leuk Esterase: Negative / RBC: x / WBC x   Sq Epi: x / Non Sq Epi: x / Bacteria: x          IMAGING:      ASSESSMENT & PLAN:

## 2019-01-18 NOTE — CONSULT NOTE ADULT - SUBJECTIVE AND OBJECTIVE BOX
S : HALEY CRYSTAL is a 53y year old Male seen at bedside with a chief complaint of   painful thickened, dystrophic, ingrowing  and long toenails digits 1-5 bilaterally  and preventative foot examination. Patient is medically managed  by Medicine/Hospitalists.  Patient denies any history o f trauma to both feet.  Patient has no other pedal complaints.  Patient is experiencing pain while standing, walking and in shoe gear.     Patient admits to  (-) Fevers, (-) Chills, (-) Nausea, (-) Vomiting, (-) Shortness of Breath (-) calf pain (-) chest pain       PMH: Hypertension  GERD (gastroesophageal reflux disease)  Bipolar disorder   PAST MEDICAL & SURGICAL HISTORY:  Hypertension  GERD (gastroesophageal reflux disease)  Bipolar disorder  Status post left hip replacement: 1/15/19  History of gastric surgery    PSH:Status post left hip replacement  History of gastric surgery    Allergies:No Known Allergies      Labs:                        13.5   5.60  )-----------( 172      ( 17 Jan 2019 07:33 )             40.4       WBC Trend  5.60 Date (01-17 @ 07:33)      Chem  01-17    142  |  102  |  12  ----------------------------<  129<H>  4.4   |  29  |  0.9    Ca    8.6      17 Jan 2019 07:33        T(F): 97.4 (01-18-19 @ 04:45), Max: 97.9 (01-17-19 @ 22:00)  HR: 58 (01-18-19 @ 04:45) (58 - 75)  BP: 115/67 (01-18-19 @ 04:45) (109/65 - 130/71)  RR: 18 (01-18-19 @ 04:45) (18 - 18)  SpO2: 95% (01-17-19 @ 19:45) (95% - 96%)  Wt(kg): --    O:   DERM:  Skin warm, dry and supple bilateral.  No open lesions or inter-digital macerations noted bilateral.   Toenails 1-5 Right and Left feet thickened, elongated, discolored, and dystrophic with subungual debris. There is pain upon palpation of all fungal and ingrowing nails 1-5 bilaterally.   VASC: Dorsalis Pedis and Posterior Tibial pulses 2/4.  Capillary re-fill time less than 3 seconds digits 1-5 bilateral.   NEURO: Protective sensation intact to the level of the digits bilateral.  MSK: Muscle strength 5/5 all major muscle groups bilateral. No structural abnormality, bilaterally    A:   1. Bilateral dystrophic toenails consistent with  Onychomycosis.   2. Pain from Elongated nails, ingrowing, incurvated,  and dystrophic nails upon palpation of nails.  3. Xerosis of bilateral plantar feet.     P:   Discussed diagnosis and treatment with patient  Aseptic debridement and curretage  of all fungal and ingrowing  nails 1-5 bilateral with sterile nail nipper.  Discussed importance of daily foot examinations, drying of feet after bathing and proper shoe gear.  Attending updated.   Please re-consult as needed.  01-18-19 @ 11:21

## 2019-01-19 ENCOUNTER — TRANSCRIPTION ENCOUNTER (OUTPATIENT)
Age: 54
End: 2019-01-19

## 2019-01-19 VITALS
DIASTOLIC BLOOD PRESSURE: 81 MMHG | HEART RATE: 77 BPM | TEMPERATURE: 97 F | RESPIRATION RATE: 18 BRPM | SYSTOLIC BLOOD PRESSURE: 138 MMHG

## 2019-01-19 LAB
ANION GAP SERPL CALC-SCNC: 13 MMOL/L — SIGNIFICANT CHANGE UP (ref 7–14)
BASOPHILS # BLD AUTO: 0.04 K/UL — SIGNIFICANT CHANGE UP (ref 0–0.2)
BASOPHILS NFR BLD AUTO: 0.6 % — SIGNIFICANT CHANGE UP (ref 0–1)
BUN SERPL-MCNC: 13 MG/DL — SIGNIFICANT CHANGE UP (ref 10–20)
CALCIUM SERPL-MCNC: 8.4 MG/DL — LOW (ref 8.5–10.1)
CHLORIDE SERPL-SCNC: 104 MMOL/L — SIGNIFICANT CHANGE UP (ref 98–110)
CO2 SERPL-SCNC: 26 MMOL/L — SIGNIFICANT CHANGE UP (ref 17–32)
CREAT SERPL-MCNC: 1 MG/DL — SIGNIFICANT CHANGE UP (ref 0.7–1.5)
EOSINOPHIL # BLD AUTO: 0.31 K/UL — SIGNIFICANT CHANGE UP (ref 0–0.7)
EOSINOPHIL NFR BLD AUTO: 4.7 % — SIGNIFICANT CHANGE UP (ref 0–8)
FLU A RESULT: NEGATIVE — SIGNIFICANT CHANGE UP
FLU A RESULT: NEGATIVE — SIGNIFICANT CHANGE UP
FLUAV AG NPH QL: NEGATIVE — SIGNIFICANT CHANGE UP
FLUBV AG NPH QL: NEGATIVE — SIGNIFICANT CHANGE UP
GLUCOSE BLDC GLUCOMTR-MCNC: 102 MG/DL — HIGH (ref 70–99)
GLUCOSE BLDC GLUCOMTR-MCNC: 103 MG/DL — HIGH (ref 70–99)
GLUCOSE SERPL-MCNC: 96 MG/DL — SIGNIFICANT CHANGE UP (ref 70–99)
HCT VFR BLD CALC: 32.1 % — LOW (ref 42–52)
HGB BLD-MCNC: 10.7 G/DL — LOW (ref 14–18)
IMM GRANULOCYTES NFR BLD AUTO: 0.5 % — HIGH (ref 0.1–0.3)
LEGIONELLA AG UR QL: NEGATIVE — SIGNIFICANT CHANGE UP
LYMPHOCYTES # BLD AUTO: 1.31 K/UL — SIGNIFICANT CHANGE UP (ref 1.2–3.4)
LYMPHOCYTES # BLD AUTO: 19.9 % — LOW (ref 20.5–51.1)
MAGNESIUM SERPL-MCNC: 1.9 MG/DL — SIGNIFICANT CHANGE UP (ref 1.8–2.4)
MCHC RBC-ENTMCNC: 30.7 PG — SIGNIFICANT CHANGE UP (ref 27–31)
MCHC RBC-ENTMCNC: 33.3 G/DL — SIGNIFICANT CHANGE UP (ref 32–37)
MCV RBC AUTO: 92.2 FL — SIGNIFICANT CHANGE UP (ref 80–94)
MONOCYTES # BLD AUTO: 0.65 K/UL — HIGH (ref 0.1–0.6)
MONOCYTES NFR BLD AUTO: 9.9 % — HIGH (ref 1.7–9.3)
NEUTROPHILS # BLD AUTO: 4.24 K/UL — SIGNIFICANT CHANGE UP (ref 1.4–6.5)
NEUTROPHILS NFR BLD AUTO: 64.4 % — SIGNIFICANT CHANGE UP (ref 42.2–75.2)
NRBC # BLD: 0 /100 WBCS — SIGNIFICANT CHANGE UP (ref 0–0)
PLATELET # BLD AUTO: 182 K/UL — SIGNIFICANT CHANGE UP (ref 130–400)
POTASSIUM SERPL-MCNC: 4.1 MMOL/L — SIGNIFICANT CHANGE UP (ref 3.5–5)
POTASSIUM SERPL-SCNC: 4.1 MMOL/L — SIGNIFICANT CHANGE UP (ref 3.5–5)
RAPID RVP RESULT: SIGNIFICANT CHANGE UP
RBC # BLD: 3.48 M/UL — LOW (ref 4.7–6.1)
RBC # FLD: 12.2 % — SIGNIFICANT CHANGE UP (ref 11.5–14.5)
RSV RESULT: NEGATIVE — SIGNIFICANT CHANGE UP
RSV RNA RESP QL NAA+PROBE: NEGATIVE — SIGNIFICANT CHANGE UP
SODIUM SERPL-SCNC: 143 MMOL/L — SIGNIFICANT CHANGE UP (ref 135–146)
VANCOMYCIN TROUGH SERPL-MCNC: 11.1 UG/ML — HIGH (ref 5–10)
WBC # BLD: 6.58 K/UL — SIGNIFICANT CHANGE UP (ref 4.8–10.8)
WBC # FLD AUTO: 6.58 K/UL — SIGNIFICANT CHANGE UP (ref 4.8–10.8)

## 2019-01-19 RX ORDER — LEVOFLOXACIN 5 MG/ML
1 INJECTION, SOLUTION INTRAVENOUS
Qty: 2 | Refills: 0
Start: 2019-01-19

## 2019-01-19 RX ORDER — OMEPRAZOLE 10 MG/1
1 CAPSULE, DELAYED RELEASE ORAL
Qty: 0 | Refills: 0 | COMMUNITY

## 2019-01-19 RX ORDER — OMEPRAZOLE 10 MG/1
1 CAPSULE, DELAYED RELEASE ORAL
Qty: 0 | Refills: 0 | DISCHARGE
Start: 2019-01-19

## 2019-01-19 RX ADMIN — Medication 81 MILLIGRAM(S): at 17:17

## 2019-01-19 RX ADMIN — Medication 250 MILLIGRAM(S): at 06:46

## 2019-01-19 RX ADMIN — PANTOPRAZOLE SODIUM 40 MILLIGRAM(S): 20 TABLET, DELAYED RELEASE ORAL at 06:46

## 2019-01-19 RX ADMIN — CEFEPIME 100 MILLIGRAM(S): 1 INJECTION, POWDER, FOR SOLUTION INTRAMUSCULAR; INTRAVENOUS at 13:36

## 2019-01-19 RX ADMIN — CHLORHEXIDINE GLUCONATE 1 APPLICATION(S): 213 SOLUTION TOPICAL at 06:46

## 2019-01-19 RX ADMIN — Medication 20 MILLIGRAM(S): at 06:46

## 2019-01-19 RX ADMIN — CEFEPIME 100 MILLIGRAM(S): 1 INJECTION, POWDER, FOR SOLUTION INTRAMUSCULAR; INTRAVENOUS at 06:46

## 2019-01-19 RX ADMIN — Medication 81 MILLIGRAM(S): at 06:46

## 2019-01-19 RX ADMIN — ENOXAPARIN SODIUM 40 MILLIGRAM(S): 100 INJECTION SUBCUTANEOUS at 13:35

## 2019-01-19 RX ADMIN — Medication 75 MILLIGRAM(S): at 06:46

## 2019-01-19 NOTE — DISCHARGE NOTE ADULT - PLAN OF CARE
improve breathing When you came, we did a CT chest with contrast that ruled out any PE or Pneumonia. Your transient fever and SOB on POD#1 could just be 2/2 atelectasis. There has been no more fever since admission. So probably there was no Pneumonia. But complete the Levaquin course for 2 more days. Rehab f/u home VN for PT. Wear the sequentials as prescribed to prevent DVT and remain active.   f/y Ortho in 10 days.

## 2019-01-19 NOTE — DISCHARGE NOTE ADULT - HOSPITAL COURSE
Attending Note :     Patient seen and examined independently. I personally had a face-to-face encounter with the patient, examined the patient myself and reviewed the plan of care with the housestaff. Agree with Resident's note but my note supersedes the resident's note in matters hereby listed.     S : No CP/SOB  Other ROS neg.    Corroborate with earlier mentioned exam.    Labs/Rad : Reviewed.     53 M with PMH HTN, GERD, bipolar Do presents with cough and SOB and a transient fever after hip replacement surgery on POD #1. Chest CTA ruled out PE. No real opacity either so his  symptoms could have been 2/2 atelectasis. Now back to baseline. But complete 2 more days of LVQ course  f/u Ortho.   C/w Home PT, remain active. Orthopedic gave just sequentials and ASA for DVT PPx.     D/c to home w/ home PT .

## 2019-01-19 NOTE — DISCHARGE NOTE ADULT - NS MD DC FALL RISK RISK
Chief Complaint:   Patient presents with:  Gout: right middle finger flare up     HPI:   This is a 43year old male presenting with right third digit pain swelling and stiffness. He has had symptoms for over 2-3 months now.   He reports that he is unable t Respiratory: Negative for cough, chest tightness and shortness of breath. Cardiovascular: Negative for chest pain, palpitations and leg swelling.    Gastrointestinal: Negative for vomiting, abdominal pain, diarrhea, blood in stool and abdominal distentio Neck: Normal range of motion. Neck supple. No JVD present. No tracheal deviation present. No thyromegaly present. Cardiovascular: Normal rate, regular rhythm, normal heart sounds and intact distal pulses. No murmur heard. Edema not present.   Pulmona For information on Fall & Injury Prevention, visit [email or web address]

## 2019-01-19 NOTE — DISCHARGE NOTE ADULT - CARE PLAN
Principal Discharge DX:	Shortness of breath  Goal:	improve breathing  Assessment and plan of treatment:	When you came, we did a CT chest with contrast that ruled out any PE or Pneumonia. Your transient fever and SOB on POD#1 could just be 2/2 atelectasis. There has been no more fever since admission. So probably there was no Pneumonia. But complete the Levaquin course for 2 more days.  Secondary Diagnosis:	Status post left hip replacement  Goal:	Rehab  Assessment and plan of treatment:	f/u home VN for PT. Wear the sequentials as prescribed to prevent DVT and remain active.   f/y Ortho in 10 days.

## 2019-01-19 NOTE — DISCHARGE NOTE ADULT - PATIENT PORTAL LINK FT
You can access the [facility] Patient Portal, offered by [facility], by registering with the following website: [email or web address]

## 2019-01-19 NOTE — DISCHARGE NOTE ADULT - MEDICATION SUMMARY - MEDICATIONS TO TAKE
I will START or STAY ON the medications listed below when I get home from the hospital:    Low Dose ASA 81 mg oral tablet  -- 1 tab(s) by mouth 2 times a day  -- Indication: For Prophylaxis    traMADol 50 mg oral tablet  -- 1 tab(s) by mouth 2 times a day  -- Indication: For Pain control    SEROquel  mg oral tablet, extended release  -- 1 tab(s) by mouth once a day (in the evening)  -- Indication: For Psych    Lasix 20 mg oral tablet  -- 1 tab(s) by mouth once a day  -- Indication: For Hypertension    omeprazole 40 mg oral delayed release capsule  -- 1 cap(s) by mouth once a day  -- Indication: For GERD (gastroesophageal reflux disease)    Levaquin 750 mg oral tablet  -- 1 tab(s) by mouth once a day   -- Avoid prolonged or excessive exposure to direct and/or artificial sunlight while taking this medication.  Do not take dairy products, antacids, or iron preparations within one hour of this medication.  Finish all this medication unless otherwise directed by prescriber.  May cause drowsiness or dizziness.  Medication should be taken with plenty of water.    -- Indication: For Pneumonia

## 2019-01-20 LAB — S PNEUM AG UR QL: NEGATIVE — SIGNIFICANT CHANGE UP

## 2019-03-14 PROBLEM — K21.9 GASTRO-ESOPHAGEAL REFLUX DISEASE WITHOUT ESOPHAGITIS: Chronic | Status: ACTIVE | Noted: 2019-01-17

## 2019-03-14 PROBLEM — I10 ESSENTIAL (PRIMARY) HYPERTENSION: Chronic | Status: ACTIVE | Noted: 2019-01-17

## 2019-03-18 ENCOUNTER — OUTPATIENT (OUTPATIENT)
Dept: OUTPATIENT SERVICES | Facility: HOSPITAL | Age: 54
LOS: 1 days | Discharge: HOME | End: 2019-03-18
Payer: COMMERCIAL

## 2019-03-18 DIAGNOSIS — Z98.890 OTHER SPECIFIED POSTPROCEDURAL STATES: Chronic | ICD-10-CM

## 2019-03-18 DIAGNOSIS — Z96.642 PRESENCE OF LEFT ARTIFICIAL HIP JOINT: Chronic | ICD-10-CM

## 2019-03-18 PROCEDURE — 93925 LOWER EXTREMITY STUDY: CPT | Mod: 26

## 2019-03-26 DIAGNOSIS — I70.213 ATHEROSCLEROSIS OF NATIVE ARTERIES OF EXTREMITIES WITH INTERMITTENT CLAUDICATION, BILATERAL LEGS: ICD-10-CM

## 2019-03-28 ENCOUNTER — APPOINTMENT (OUTPATIENT)
Dept: VASCULAR SURGERY | Facility: CLINIC | Age: 54
End: 2019-03-28
Payer: MEDICARE

## 2019-03-28 ENCOUNTER — RECORD ABSTRACTING (OUTPATIENT)
Age: 54
End: 2019-03-28

## 2019-03-28 VITALS
BODY MASS INDEX: 32.88 KG/M2 | HEIGHT: 76 IN | WEIGHT: 270 LBS | DIASTOLIC BLOOD PRESSURE: 70 MMHG | SYSTOLIC BLOOD PRESSURE: 110 MMHG

## 2019-03-28 PROCEDURE — 99203 OFFICE O/P NEW LOW 30 MIN: CPT

## 2019-03-28 RX ORDER — IBUPROFEN 200 MG/1
CAPSULE, LIQUID FILLED ORAL
Refills: 0 | Status: ACTIVE | COMMUNITY

## 2019-03-28 RX ORDER — PNV NO.95/FERROUS FUM/FOLIC AC 28MG-0.8MG
TABLET ORAL
Refills: 0 | Status: ACTIVE | COMMUNITY

## 2019-03-28 RX ORDER — MULTIVIT-MIN/IRON/FOLIC ACID/K 18-600-40
CAPSULE ORAL
Refills: 0 | Status: ACTIVE | COMMUNITY

## 2019-03-28 RX ORDER — VITAMIN B COMPLEX
CAPSULE ORAL
Refills: 0 | Status: ACTIVE | COMMUNITY

## 2019-03-28 RX ORDER — IRON/IRON ASP GLY/FA/MV-MIN 38 125-25-1MG
TABLET ORAL
Refills: 0 | Status: ACTIVE | COMMUNITY

## 2019-03-28 NOTE — ASSESSMENT
[FreeTextEntry1] : 52 y/o gentleman sent in for right GSV superficial phlebitis found on duplex today, states that he has been having pain and swelling to right medial thigh and leg for the past 2 weeks, was seen by PMD and given oral antibiotics, was seen by Cardiology and had a venous duplex that showed right GSV superficial phlebitis extending into the saphenofemoral junction. Denies any similar symptoms in the past. Has long standing h/o varicose veins. I am starting him on Xarelto for anticoagulation as there is acute thrombus at the saphenofemoral junction and advised him on doing warm compresses to the area few times a day, Tylenol for pain. I will see him back in six weeks for repeat venous duplex.

## 2019-03-28 NOTE — CONSULT LETTER
[Dear  ___] : Dear  [unfilled], [Consult Letter:] : I had the pleasure of evaluating your patient, [unfilled]. [Please see my note below.] : Please see my note below. [FreeTextEntry2] : Dear Dr. Magdy Davidson,

## 2019-03-28 NOTE — HISTORY OF PRESENT ILLNESS
[FreeTextEntry1] : 54 y/o gentleman sent in for right GSV superficial phlebitis found on duplex today, states that he has been having pain and swelling to right medial thigh and leg for the past 2 weeks, was seen by PMD and given oral antibiotics, was seen by Cardiology and had a venous duplex that showed right GSV superficial phlebitis extending into the saphenofemoral junction. Denies any similar symptoms in the past. Has long standing h/o varicose veins.

## 2019-05-09 ENCOUNTER — APPOINTMENT (OUTPATIENT)
Dept: VASCULAR SURGERY | Facility: CLINIC | Age: 54
End: 2019-05-09
Payer: MEDICARE

## 2019-05-09 ENCOUNTER — OTHER (OUTPATIENT)
Age: 54
End: 2019-05-09

## 2019-05-09 PROCEDURE — 99213 OFFICE O/P EST LOW 20 MIN: CPT

## 2019-05-09 PROCEDURE — 93971 EXTREMITY STUDY: CPT

## 2019-05-09 NOTE — ASSESSMENT
[FreeTextEntry1] : 52 y/o gentleman sent in for right GSV superficial phlebitis extending into the saphenofemoral junction. Denies any similar symptoms in the past. Has long standing h/o varicose veins. He has been on Xarelto for the past 6 weeks, symptoms are significantly improved, pain to the right thigh has resolved.\par I performed a venous duplex which was medically necessary to evaluate for resolution of thrombosis. It showed superficial phlebitis in one of the branches of the GSV and partial resolution of thrombus in the saphenofemoral junction. I have informed him of the test results and advised him to continue anticoagulation for another six weeks.\par I will see him back in six weeks for repeat venous duplex.

## 2019-05-09 NOTE — CONSULT LETTER
[Dear  ___] : Dear  [unfilled], [Courtesy Letter:] : I had the pleasure of seeing your patient, [unfilled], in my office today. [Please see my note below.] : Please see my note below. [FreeTextEntry2] : Dear Dr. Magdy Davidson,

## 2019-05-09 NOTE — DATA REVIEWED
[FreeTextEntry1] : I performed a venous duplex which was medically necessary to evaluate for resolution of thrombosis. It showed superficial phlebitis in one of the branches of the GSV and partial resolution of thrombus in the saphenofemoral junction.\par

## 2019-05-09 NOTE — HISTORY OF PRESENT ILLNESS
[FreeTextEntry1] : 52 y/o gentleman sent in for right GSV superficial phlebitis extending into the saphenofemoral junction. Denies any similar symptoms in the past. Has long standing h/o varicose veins. He has been on Xarelto for the past 6 weeks, symptoms are significantly improved, pain to the right thigh has resolved.

## 2019-07-10 ENCOUNTER — APPOINTMENT (OUTPATIENT)
Dept: CARDIOLOGY | Facility: CLINIC | Age: 54
End: 2019-07-10
Payer: MEDICARE

## 2019-07-10 PROCEDURE — 93000 ELECTROCARDIOGRAM COMPLETE: CPT

## 2019-07-10 PROCEDURE — 99213 OFFICE O/P EST LOW 20 MIN: CPT

## 2019-07-11 ENCOUNTER — APPOINTMENT (OUTPATIENT)
Dept: VASCULAR SURGERY | Facility: CLINIC | Age: 54
End: 2019-07-11
Payer: MEDICARE

## 2019-07-11 ENCOUNTER — OTHER (OUTPATIENT)
Age: 54
End: 2019-07-11

## 2019-07-11 VITALS
HEIGHT: 76 IN | DIASTOLIC BLOOD PRESSURE: 76 MMHG | WEIGHT: 270 LBS | SYSTOLIC BLOOD PRESSURE: 118 MMHG | BODY MASS INDEX: 32.88 KG/M2

## 2019-07-11 PROCEDURE — 93971 EXTREMITY STUDY: CPT

## 2019-07-11 PROCEDURE — 99213 OFFICE O/P EST LOW 20 MIN: CPT

## 2019-07-11 NOTE — DATA REVIEWED
[FreeTextEntry1] : I performed a venous duplex which was medically necessary to evaluate for resolution of thrombosis. It showed chronic changes noted to SFJ, persistent phlebitis noted to GSV in the mid thigh.

## 2019-07-11 NOTE — ASSESSMENT
[FreeTextEntry1] : 52 y/o gentleman sent in for right GSV superficial phlebitis extending into the saphenofemoral junction. Denies any similar symptoms in the past. Has long standing h/o varicose veins. He has been on Xarelto for the past 6 weeks, symptoms are significantly improved, pain to the right thigh has resolved.\par I performed a venous duplex which was medically necessary to evaluate for resolution of thrombosis. It showed chronic changes noted to SFJ, persistent phlebitis noted to GSV in the mid thigh.\par He has had adequate anticoagulation and may discontinue Xarelto at this time. I will see him back in six months for repeat venous duplex.

## 2019-08-06 ENCOUNTER — EMERGENCY (EMERGENCY)
Facility: HOSPITAL | Age: 54
LOS: 0 days | Discharge: HOME | End: 2019-08-06
Attending: EMERGENCY MEDICINE | Admitting: EMERGENCY MEDICINE
Payer: MEDICARE

## 2019-08-06 VITALS
OXYGEN SATURATION: 95 % | HEART RATE: 68 BPM | SYSTOLIC BLOOD PRESSURE: 148 MMHG | RESPIRATION RATE: 18 BRPM | TEMPERATURE: 96 F | DIASTOLIC BLOOD PRESSURE: 89 MMHG

## 2019-08-06 VITALS
DIASTOLIC BLOOD PRESSURE: 91 MMHG | HEART RATE: 65 BPM | RESPIRATION RATE: 17 BRPM | SYSTOLIC BLOOD PRESSURE: 136 MMHG | TEMPERATURE: 97 F | OXYGEN SATURATION: 97 %

## 2019-08-06 DIAGNOSIS — F17.200 NICOTINE DEPENDENCE, UNSPECIFIED, UNCOMPLICATED: ICD-10-CM

## 2019-08-06 DIAGNOSIS — Z96.642 PRESENCE OF LEFT ARTIFICIAL HIP JOINT: Chronic | ICD-10-CM

## 2019-08-06 DIAGNOSIS — M25.472 EFFUSION, LEFT ANKLE: ICD-10-CM

## 2019-08-06 DIAGNOSIS — M25.572 PAIN IN LEFT ANKLE AND JOINTS OF LEFT FOOT: ICD-10-CM

## 2019-08-06 DIAGNOSIS — I82.4Z2 ACUTE EMBOLISM AND THROMBOSIS OF UNSPECIFIED DEEP VEINS OF LEFT DISTAL LOWER EXTREMITY: ICD-10-CM

## 2019-08-06 DIAGNOSIS — Z98.890 OTHER SPECIFIED POSTPROCEDURAL STATES: Chronic | ICD-10-CM

## 2019-08-06 LAB
ALBUMIN SERPL ELPH-MCNC: 3.6 G/DL — SIGNIFICANT CHANGE UP (ref 3.5–5.2)
ALP SERPL-CCNC: 78 U/L — SIGNIFICANT CHANGE UP (ref 30–115)
ALT FLD-CCNC: 27 U/L — SIGNIFICANT CHANGE UP (ref 0–41)
ANION GAP SERPL CALC-SCNC: 13 MMOL/L — SIGNIFICANT CHANGE UP (ref 7–14)
APTT BLD: 31.4 SEC — SIGNIFICANT CHANGE UP (ref 27–39.2)
AST SERPL-CCNC: 34 U/L — SIGNIFICANT CHANGE UP (ref 0–41)
BASOPHILS # BLD AUTO: 0.08 K/UL — SIGNIFICANT CHANGE UP (ref 0–0.2)
BASOPHILS NFR BLD AUTO: 1.1 % — HIGH (ref 0–1)
BILIRUB SERPL-MCNC: 0.4 MG/DL — SIGNIFICANT CHANGE UP (ref 0.2–1.2)
BUN SERPL-MCNC: 12 MG/DL — SIGNIFICANT CHANGE UP (ref 10–20)
CALCIUM SERPL-MCNC: 9 MG/DL — SIGNIFICANT CHANGE UP (ref 8.5–10.1)
CHLORIDE SERPL-SCNC: 101 MMOL/L — SIGNIFICANT CHANGE UP (ref 98–110)
CO2 SERPL-SCNC: 29 MMOL/L — SIGNIFICANT CHANGE UP (ref 17–32)
CREAT SERPL-MCNC: 1.2 MG/DL — SIGNIFICANT CHANGE UP (ref 0.7–1.5)
EOSINOPHIL # BLD AUTO: 0.33 K/UL — SIGNIFICANT CHANGE UP (ref 0–0.7)
EOSINOPHIL NFR BLD AUTO: 4.7 % — SIGNIFICANT CHANGE UP (ref 0–8)
GLUCOSE SERPL-MCNC: 89 MG/DL — SIGNIFICANT CHANGE UP (ref 70–99)
HCT VFR BLD CALC: 43 % — SIGNIFICANT CHANGE UP (ref 42–52)
HGB BLD-MCNC: 14.4 G/DL — SIGNIFICANT CHANGE UP (ref 14–18)
IMM GRANULOCYTES NFR BLD AUTO: 0.6 % — HIGH (ref 0.1–0.3)
INR BLD: 1.01 RATIO — SIGNIFICANT CHANGE UP (ref 0.65–1.3)
LYMPHOCYTES # BLD AUTO: 1.44 K/UL — SIGNIFICANT CHANGE UP (ref 1.2–3.4)
LYMPHOCYTES # BLD AUTO: 20.4 % — LOW (ref 20.5–51.1)
MCHC RBC-ENTMCNC: 31 PG — SIGNIFICANT CHANGE UP (ref 27–31)
MCHC RBC-ENTMCNC: 33.5 G/DL — SIGNIFICANT CHANGE UP (ref 32–37)
MCV RBC AUTO: 92.5 FL — SIGNIFICANT CHANGE UP (ref 80–94)
MONOCYTES # BLD AUTO: 0.65 K/UL — HIGH (ref 0.1–0.6)
MONOCYTES NFR BLD AUTO: 9.2 % — SIGNIFICANT CHANGE UP (ref 1.7–9.3)
NEUTROPHILS # BLD AUTO: 4.52 K/UL — SIGNIFICANT CHANGE UP (ref 1.4–6.5)
NEUTROPHILS NFR BLD AUTO: 64 % — SIGNIFICANT CHANGE UP (ref 42.2–75.2)
NRBC # BLD: 0 /100 WBCS — SIGNIFICANT CHANGE UP (ref 0–0)
PLATELET # BLD AUTO: 217 K/UL — SIGNIFICANT CHANGE UP (ref 130–400)
POTASSIUM SERPL-MCNC: 4.5 MMOL/L — SIGNIFICANT CHANGE UP (ref 3.5–5)
POTASSIUM SERPL-SCNC: 4.5 MMOL/L — SIGNIFICANT CHANGE UP (ref 3.5–5)
PROT SERPL-MCNC: 6.3 G/DL — SIGNIFICANT CHANGE UP (ref 6–8)
PROTHROM AB SERPL-ACNC: 11.6 SEC — SIGNIFICANT CHANGE UP (ref 9.95–12.87)
RBC # BLD: 4.65 M/UL — LOW (ref 4.7–6.1)
RBC # FLD: 13.2 % — SIGNIFICANT CHANGE UP (ref 11.5–14.5)
SODIUM SERPL-SCNC: 143 MMOL/L — SIGNIFICANT CHANGE UP (ref 135–146)
WBC # BLD: 7.06 K/UL — SIGNIFICANT CHANGE UP (ref 4.8–10.8)
WBC # FLD AUTO: 7.06 K/UL — SIGNIFICANT CHANGE UP (ref 4.8–10.8)

## 2019-08-06 PROCEDURE — 93970 EXTREMITY STUDY: CPT | Mod: 26

## 2019-08-06 PROCEDURE — 99284 EMERGENCY DEPT VISIT MOD MDM: CPT

## 2019-08-06 PROCEDURE — 73600 X-RAY EXAM OF ANKLE: CPT | Mod: 26,LT

## 2019-08-06 RX ORDER — RIVAROXABAN 15 MG-20MG
1 KIT ORAL
Qty: 30 | Refills: 0
Start: 2019-08-06 | End: 2019-09-04

## 2019-08-06 NOTE — ED ADULT TRIAGE NOTE - CHIEF COMPLAINT QUOTE
c/o left ankle pain, hot to touch, swollen that started 2 days ago, denies injury,  hx of DVT, recently stopped taking xarelto 3 weeks ago

## 2019-08-06 NOTE — ED ADULT NURSE NOTE - OBJECTIVE STATEMENT
Patient present to ED with complains of LLE swelling and redness x 1 week, has hx of DVT, not on xarelto anymore, denies SOB, chest pain, fever, chills, nausea, vomiting, and diarrhea.

## 2019-08-06 NOTE — ED ADULT NURSE NOTE - NSIMPLEMENTINTERV_GEN_ALL_ED
Implemented All Universal Safety Interventions:  [place] to call system. Call bell, personal items and telephone within reach. Instruct patient to call for assistance. Room bathroom lighting operational. Non-slip footwear when patient is off stretcher. Physically safe environment: no spills, clutter or unnecessary equipment. Stretcher in lowest position, wheels locked, appropriate side rails in place.

## 2019-08-06 NOTE — ED PROVIDER NOTE - ATTENDING CONTRIBUTION TO CARE
54 y/o male with h/o LE venous thrombosis, s/p ton en y bypass ~ 6 yrs ago, s/p L hip replacement 1/2019 in ER with c/o L ankle pain/swelling for the past few days.  No redness or rash.  no trauma to area.  no calf pain.  Pt with h/o RLE thrombosis 3/2010 - pt states was superficial, but was close to deep veins and so was placed on xarelto.  Follows with vasc, Dr. Ruiz, 3 weeks ago was taken off xarelto, still taking low dose ASA.  Pt states he had the same symptoms in 3/2019.   Drove back from [place] ~ 2-3 weeks ago. non-smoker.  Denies any CP/SOB.  No f/c.  no n/v/d.  PE - nad, nc/at, eomi, perrl, op - clear, cta b/l, no w/r/r, rrr, abd - soft, nt/nd, nabs, from x 4, LLE - + swelling/tenderness to ankle/lower leg, no erythema, 2+ dp pulse, no calf tenderness, A&O x 3, no focal neuro deficits.  -check LE duplex.

## 2019-08-06 NOTE — ED PROVIDER NOTE - PROGRESS NOTE DETAILS
Spoke with vascular tech. Superficial clot over L great saphenous vein. [facility] HIRAM mary recommends xarelto 20 once a day x 1 month and follow up with jeanie and yessica. Reviewed all results and necessity for follow up. Counseled on red flags and to return for them.  Patient appears well on discharge.

## 2019-08-06 NOTE — ED PROVIDER NOTE - OBJECTIVE STATEMENT
53 y m pmh dvt, ton en y gastric bypass, L hip replacement 1/2019, htn pw L ankle swelling. L medial ankle swelling for the past few days. Associated with burning leg pain over the ankle. States he had the same sx when he had dvt in the RLE in March. Pain with movement of the ankle and walking. Denies trauma or inciting incident. Stopped taking xarelto 3 weeks ago. Denies cp, sob, abd pain, back pain, palpitations, n/v, abd pain, leg pain, hip pain.

## 2019-08-06 NOTE — ED PROVIDER NOTE - PHYSICAL EXAMINATION
CONSTITUTIONAL: Well-developed; well-nourished; in no acute distress.   SKIN: warm, dry  HEAD: Normocephalic; atraumatic.  EYES: normal sclera and conjunctiva   ENT: No nasal discharge; airway clear.  NECK: Supple; non tender.  CARD: S1, S2 normal; no murmurs, gallops, or rubs. Regular rate and rhythm.   RESP: No wheezes, rales or rhonchi.  ABD: soft ntnd  EXT: Normal ROM.  L medial ankle swelling over medial malleolus. DP/PT intact and equal bilaterally. NO calf swelling/posterior calf ttp.   LYMPH: No acute cervical adenopathy.  NEURO: Alert, oriented, grossly unremarkable  PSYCH: Cooperative, appropriate.

## 2019-08-06 NOTE — ED PROVIDER NOTE - NS ED ROS FT
Eyes:  No visual changes, eye pain or discharge.  ENMT:  No hearing changes, pain, discharge or infections. No neck pain or stiffness.  Cardiac:  No chest pain, SOB or edema. No chest pain with exertion.  Respiratory:  No cough or respiratory distress.   GI:  No nausea, vomiting, diarrhea or abdominal pain.  :  No dysuria, frequency or burning.  MS:  L ankle pain and swelling. No myalgia, muscle weakness, or back pain. NO calf swelling.   Neuro:  No headache or weakness.  No LOC.  Skin:  No skin rash.   Endocrine: No history of thyroid disease or diabetes.

## 2019-08-06 NOTE — ED PROVIDER NOTE - NSFOLLOWUPINSTRUCTIONS_ED_ALL_ED_FT
Deep Vein Thrombosis    A deep vein thrombosis (DVT) is a blood clot (thrombus) that usually occurs in a deep, larger vein of the lower leg or the pelvis, or in an upper extremity such as the arm. These are dangerous and can lead to serious and even life-threatening complications if the clot travels to the lungs. Symptoms include swelling of the arm or leg, warmth and redness of the arm or leg, and pain. Treatment may include taking a blood thinning medication; make sure to take anything prescribed as instructed.    SEEK IMMEDIATE MEDICAL CARE IF YOU HAVE ANY OF THE FOLLOWING SYMPTOMS: shortness of breath, chest pain, rapid or irregular heartbeat, lightheadedness/dizziness, coughing up blood, or any bleeding in your vomit, stool, or urine. These symptoms may represent a serious problem that is an emergency. Do not wait to see if the symptoms will go away. Call 911 and do not drive yourself to the hospital.

## 2019-08-06 NOTE — CONSULT NOTE ADULT - SUBJECTIVE AND OBJECTIVE BOX
· HPI Objective Statement: 53 y m pmh dvt, ton en y gastric bypass, L hip replacement 1/2019, htn pw L ankle swelling. L medial ankle swelling for the past few days. Associated with burning leg pain over the ankle. States he had the same sx when he had dvt in the RLE in March. Pain with movement of the ankle and walking. Denies trauma or inciting incident. Stopped taking xarelto 3 weeks ago. Denies cp, sob, abd pain, back pain, palpitations, n/v, abd pain, leg pain, hip pain.	            CONSULT GENERAL SURGERY:  VASCULAR SUGERY CONSULT:    HALEY CRYSTAL  [id number]  [facility] ED    Attending Requested :Not Available Doctor; Torie Chaudhry; Regina Scales; Everardo Esposito; Cyril Bishop  08-06-19 @ 22:03  Routine []   Stat[]  Specialty: Vascular Surgery General Surgery    Clinical Issue to be evaluated by consultant:    HPI:  Patient is a 53y Male presenting with            Past Medical History/ Surgical History:  ^R/O BLOOD CLOT  No pertinent family history in first degree relatives  MEWS Score  DVT (deep venous thrombosis)  Hypertension  GERD (gastroesophageal reflux disease)  Bipolar disorder  Status post left hip replacement  History of gastric surgery  R/O BLOOD CLOT  90+    Allergies:No Known Allergies    Medications:Lasix 20 mg oral tablet: 1 tab(s) orally once a day  Levaquin 750 mg oral tablet: 1 tab(s) orally once a day   Low Dose ASA 81 mg oral tablet: 1 tab(s) orally 2 times a day  omeprazole 40 mg oral delayed release capsule: 1 cap(s) orally once a day  SEROquel  mg oral tablet, extended release: 1 tab(s) orally once a day (in the evening)  traMADol 50 mg oral tablet: 1 tab(s) orally 2 times a day      Physical Exam:  Vitals:T(C): 35.9 (08-06-19 @ 21:09), Max: 35.9 (08-06-19 @ 21:09)  HR: 65 (08-06-19 @ 21:09) (65 - 68)  BP: 136/91 (08-06-19 @ 21:09) (136/91 - 148/89)  RR: 17 (08-06-19 @ 21:09) (17 - 18)  SpO2: 97% (08-06-19 @ 21:09) (95% - 97%)    General: Alert and oriented times 3 , Not in acute distress   Heart: Regular rate and rhythm , no rubs murmurs or gallops  Lungs: Clear to auscultation , no wheezes , rales rhonci or adventicious breath sounds  Abdomen: Soft , positive bowel sounds, non tender, non distended, no peritoneal signs  Extemities:  left ankle swelling and tenderness extends to mid shin  warm, capillary refill, good motor and sensation positive [palp pulses                14.4   7.06  )-----------( 217      ( 08-06 @ 17:55 )             43.0                    143   |  101   |  12                 Ca: 9.0    BMP:   ----------------------------< 89     Mg: x     (08-06-19 @ 17:55)             4.5    |  29    | 1.2                Ph: x        LFT:     TPro: 6.3 / Alb: 3.6 / TBili: 0.4 / DBili: x / AST: 34 / ALT: 27 / AlkPhos: 78   (08-06-19 @ 17:55)          PT/INR - ( 06 Aug 2019 17:55 )   PT: 11.60 sec;   INR: 1.01 ratio         PTT - ( 06 Aug 2019 17:55 )  PTT:31.4 sec          venous duplex: SUPERFICIAL THROMBOPHELBITIS GSV BY ANKLE        Assesment and Plan:  Patient is a 53y Male presenting with left lower extremity swelling    hematology consult  xarelto 6 months   follow up with dr lim 2 weeks out patient       BARBIE Patel  08-06-19 @ 22:03  #[id number]/ [id number]

## 2019-08-06 NOTE — ED ADULT NURSE NOTE - PMH
Bipolar disorder    DVT (deep venous thrombosis)    GERD (gastroesophageal reflux disease)    Hypertension

## 2019-08-06 NOTE — ED PROVIDER NOTE - CARE PROVIDERS DIRECT ADDRESSES
[email or web address]

## 2019-08-06 NOTE — ED PROVIDER NOTE - CLINICAL SUMMARY MEDICAL DECISION MAKING FREE TEXT BOX
Pt with  h/o RLE thrombosis, was taken off xarelto 3 weeks ago, now with pain/swelling to L ankle.  No cp/sob.  LE duplex - no DVT, + L greater saphenous vein superficial thrombophlebitis at the ankle.  Pt seen bu vascular, to restart pt on xarelto and pt to f/u with vascular and heme/onc.  Pt told to return to ER immediately for CP/SOB, syncope,  or other new/concerning symptoms. pt understands and agrees with plan.

## 2019-08-06 NOTE — ED PROVIDER NOTE - PROVIDER TOKENS
PROVIDER:[TOKEN:[[id number]],FOLLOWUP:[1-3 Days]],PROVIDER:[TOKEN:[[id number]],FOLLOWUP:[1-3 Days]]

## 2019-08-06 NOTE — ED ADULT NURSE REASSESSMENT NOTE - NS ED NURSE REASSESS COMMENT FT1
Patient reassessed, resting in bed with no acute distress, awaiting for further disposition and assessment, no SOB, and chest pain at this time, will continue to watch and assess patient, safety and comfort measures maintained.

## 2019-08-08 ENCOUNTER — INBOUND DOCUMENT (OUTPATIENT)
Age: 54
End: 2019-08-08

## 2019-08-08 PROBLEM — I82.409 ACUTE EMBOLISM AND THROMBOSIS OF UNSPECIFIED DEEP VEINS OF UNSPECIFIED LOWER EXTREMITY: Chronic | Status: ACTIVE | Noted: 2019-08-06

## 2019-08-12 ENCOUNTER — APPOINTMENT (OUTPATIENT)
Dept: VASCULAR SURGERY | Facility: CLINIC | Age: 54
End: 2019-08-12
Payer: MEDICARE

## 2019-08-12 PROCEDURE — 93970 EXTREMITY STUDY: CPT

## 2019-08-12 PROCEDURE — 99213 OFFICE O/P EST LOW 20 MIN: CPT

## 2019-08-12 NOTE — ASSESSMENT
[FreeTextEntry1] : 52 y/o gentleman with h/o right GSV superficial phlebitis extending into the saphenofemoral junction, was treated with 3 months of Xarelto, and Xarelto discontinued in July 2019, was seen in ED 2 days ago for left ankle pain and swelling,duplex showed phlebitis in the varicosities at the ankle,was prescribed Xarelto.\par I performed a venous duplex which was medically necessary to evaluate for resolution of thrombosis. It showed no evidence of DVT or phlebitis in the right lower extremity, no DVT in the left lower extremity. There was superficial phlebitis in the varicosities of the left GSV at the ankle.\par I have informed him of the test results, advised him to continue Xarelto, and consultation with Hematology for a hypercoagulable work up and see me back for follow up in 3 months time.

## 2019-08-12 NOTE — DATA REVIEWED
[FreeTextEntry1] : I performed a venous duplex which was medically necessary to evaluate for resolution of thrombosis. It showed no evidence of DVT or phlebitis in the right lower extremity, no DVT in the left lower extremity. There was superficial phlebitis in the varicosities of the left GSV at the ankle.

## 2019-08-12 NOTE — CONSULT LETTER
[Dear  ___] : Dear  [unfilled], [Please see my note below.] : Please see my note below. [Courtesy Letter:] : I had the pleasure of seeing your patient, [unfilled], in my office today. [FreeTextEntry2] : Dear Dr. Magdy Davidson,

## 2019-08-12 NOTE — HISTORY OF PRESENT ILLNESS
[FreeTextEntry1] : 54 y/o gentleman with h/o right GSV superficial phlebitis extending into the saphenofemoral junction, was treated with 3 months of Xarelto, and Xarelto discontinued in July 2019, was seen in ED 2 days ago for left ankle pain and swelling,duplex showed phlebitis in the varicosities at the ankle,was prescribed Xarelto.

## 2019-08-22 NOTE — ED ADULT TRIAGE NOTE - AS TEMP SITE
Intravenous 2 times per day    famotidine  20 mg Oral BID    budesonide  1 mg Nebulization BID    And    albuterol  2.5 mg Nebulization 4x daily     glucose, dextrose, glucagon (rDNA), dextrose, acetaminophen, albuterol sulfate HFA, nitroGLYCERIN, HYDROmorphone, sodium chloride flush, magnesium hydroxide, ondansetron, HYDROmorphone, oxyCODONE, Benzocaine-Menthol  DIET CARB CONTROL;         Lab and other Data:     Recent Labs     08/20/19  0806   WBC 6.0   HGB 9.9*   *     Recent Labs     08/20/19  0806      K 3.8      CO2 23   BUN 15   CREATININE 0.9   GLUCOSE 132*     No results for input(s): AST, ALT, ALB, BILITOT, ALKPHOS in the last 72 hours. Troponin T: No results for input(s): TROPONINI in the last 72 hours. Pro-BNP: No results for input(s): BNP in the last 72 hours. INR:   No results for input(s): INR in the last 72 hours. ABGs: No results found for: PHART, PO2ART, QAW8VFV  UA:  No results for input(s): NITRITE, COLORU, PHUR, LABCAST, WBCUA, RBCUA, MUCUS, TRICHOMONAS, YEAST, BACTERIA, CLARITYU, SPECGRAV, LEUKOCYTESUR, UROBILINOGEN, BILIRUBINUR, BLOODU, GLUCOSEU, AMORPHOUS in the last 72 hours. Invalid input(s): KETONESU    Imaging:   XR CHEST PORTABLE   Final Result   1. Bilateral perihilar interstitial infiltrates. Most likely this   pulmonary vascular congestion however this is slightly improved from   August 20. Signed by Dr Umang Tse on 8/21/2019 2:35 PM      XR CHEST PORTABLE   Final Result   Pulmonary hypoventilation with central vascular congestion   and mild pulmonary edema compatible with partial overload. Pneumonia   is felt less likely. COPD. Signed by Dr Heaven Marie on 8/20/2019 8:10 AM      FL RETROGRADE PYELOGRAM LEFT   Final Result   Impression:   Intraoperative fluoroscopic images obtained during left-sided a laser   lithotripsy. A double-J ureteral stent was placed. Please refer to the operative note for more details.        Signed by Dr Stephanie Schumacher oral

## 2019-08-23 ENCOUNTER — APPOINTMENT (OUTPATIENT)
Dept: HEMATOLOGY ONCOLOGY | Facility: CLINIC | Age: 54
End: 2019-08-23
Payer: MEDICARE

## 2019-08-23 VITALS
DIASTOLIC BLOOD PRESSURE: 91 MMHG | TEMPERATURE: 96.4 F | HEART RATE: 69 BPM | BODY MASS INDEX: 35.31 KG/M2 | HEIGHT: 76 IN | SYSTOLIC BLOOD PRESSURE: 133 MMHG | WEIGHT: 290 LBS | OXYGEN SATURATION: 96 % | RESPIRATION RATE: 16 BRPM

## 2019-08-23 DIAGNOSIS — Z87.891 PERSONAL HISTORY OF NICOTINE DEPENDENCE: ICD-10-CM

## 2019-08-23 PROCEDURE — 99204 OFFICE O/P NEW MOD 45 MIN: CPT

## 2019-09-02 LAB
AT III PPP CHRO-ACNC: 138 %
B2 GLYCOPROT1 IGA SERPL IA-ACNC: <5 SAU
B2 GLYCOPROT1 IGG SER-ACNC: <5 SGU
B2 GLYCOPROT1 IGM SER-ACNC: <5 SMU
CARDIOLIPIN AB SER IA-ACNC: NEGATIVE
DNA PLOIDY SPEC FC-IMP: NORMAL
PROT C PPP CHRO-ACNC: 105 %
PROT S AG ACT/NOR PPP IA: 89 %
PROT S FREE PPP-ACNC: 136 % NORMAL
PTR INTERP: NORMAL

## 2019-09-02 NOTE — HISTORY OF PRESENT ILLNESS
[de-identified] : 52 yo male is referred by Dr. Ruiz for hypercoagulable workup. He had left hip replacement on 1/15/2019 at [facility] and developed SOB on 1/17/19. He went to ER, and was ruled out for  PE. He was not on ASA or AC after hip surgery. In 3/2019, he developed right leg pain up to groin and found to right GSV superficial phlebitis extending into the saphenofemoral junction. He was on  Xarelto for 3 months.  Xarelto discontinued in July 2019. On 8/6/19, he felt burning and pain in the left ankle and went ER.  Duplex showed phlebitis in the varicosities at the ankle. He resumed  Xarelto and takes baby ASA  twice a day. His symptoms has resolved.\par \par He had bariatric surgery in 10/2013 and lost > 100 pounds.

## 2019-09-02 NOTE — ASSESSMENT
[FreeTextEntry1] : 54 yo male has recurrent superficial phlebitis.\par \par Recommendation:\par -- Will order hypercoagulable blood work: Factor V Leiden, Prothrombin gene mutation, ATIII, protein C activity, Protein S Ag and activity, anticardiolipin antibodies and beta2 glycoprotein I antibodies. Lupus anticoagulant cannot be done at this time because patient is taking Xarelto. \par -- Continue Xarelto and followup with Dr. Ruiz.\par -- RTO to discuss results in 2 weeks.

## 2019-09-02 NOTE — CONSULT LETTER
[Dear  ___] : Dear  [unfilled], [Consult Letter:] : I had the pleasure of evaluating your patient, [unfilled]. [Please see my note below.] : Please see my note below. [Consult Closing:] : Thank you very much for allowing me to participate in the care of this patient.  If you have any questions, please do not hesitate to contact me. [Sincerely,] : Sincerely, [FreeTextEntry3] : Sol Adams MD

## 2019-09-13 ENCOUNTER — OUTPATIENT (OUTPATIENT)
Dept: OUTPATIENT SERVICES | Facility: HOSPITAL | Age: 54
LOS: 1 days | Discharge: HOME | End: 2019-09-13

## 2019-09-13 ENCOUNTER — APPOINTMENT (OUTPATIENT)
Dept: HEMATOLOGY ONCOLOGY | Facility: CLINIC | Age: 54
End: 2019-09-13
Payer: MEDICARE

## 2019-09-13 VITALS
DIASTOLIC BLOOD PRESSURE: 87 MMHG | TEMPERATURE: 96.8 F | HEIGHT: 76 IN | SYSTOLIC BLOOD PRESSURE: 147 MMHG | RESPIRATION RATE: 16 BRPM | WEIGHT: 296 LBS | HEART RATE: 65 BPM | BODY MASS INDEX: 36.04 KG/M2

## 2019-09-13 DIAGNOSIS — Z96.642 PRESENCE OF LEFT ARTIFICIAL HIP JOINT: Chronic | ICD-10-CM

## 2019-09-13 DIAGNOSIS — Z98.890 OTHER SPECIFIED POSTPROCEDURAL STATES: Chronic | ICD-10-CM

## 2019-09-13 DIAGNOSIS — I80.01 PHLEBITIS AND THROMBOPHLEBITIS OF SUPERFICIAL VESSELS OF RIGHT LOWER EXTREMITY: ICD-10-CM

## 2019-09-13 PROCEDURE — 99213 OFFICE O/P EST LOW 20 MIN: CPT

## 2019-09-14 NOTE — CONSULT LETTER
[Dear  ___] : Dear  [unfilled], [Please see my note below.] : Please see my note below. [Sincerely,] : Sincerely, [Courtesy Letter:] : I had the pleasure of seeing your patient, [unfilled], in my office today. [FreeTextEntry3] : Sol Adams MD

## 2019-09-14 NOTE — ASSESSMENT
[FreeTextEntry1] : 54 yo male has recurrent superficial phlebitis.\par \par Recommendation:\par -- Hypercoagulable work up is unrevealing.  Lupus anticoagulant cannot be done at this time because patient is taking Xarelto. \par -- Continue Xarelto and followup with Dr. Ruiz.\par -- RTO for followup as needed.

## 2019-09-14 NOTE — HISTORY OF PRESENT ILLNESS
[de-identified] : 54 yo male was referred by Dr. Ruiz for hypercoagulable workup. He had left hip replacement on 1/15/2019 at [facility] and developed SOB on 1/17/19. He went to ER, and was ruled out for  PE. He was not on ASA or AC after hip surgery. In 3/2019, he developed right leg pain up to groin and found to right GSV superficial phlebitis extending into the saphenofemoral junction. He was on  Xarelto for 3 months.  Xarelto discontinued in July 2019. On 8/6/19, he felt burning and pain in the left ankle and went ER.  Duplex showed phlebitis in the varicosities at the ankle. He resumed  Xarelto and takes baby ASA  twice a day. His symptoms has resolved.\par \par He had bariatric surgery in 10/2013 and lost > 100 pounds. [de-identified] : 9/13/19:\par The patient is here for follow up and discuss blood test results. He had hypercoagulable workup for recurrent superficial phlebitis. Factor V Leiden and prothrombin gene mutation are negative. ATIII, protein C activity, protein S Ag and activity are normal. Anticardilipin antibody and beta2 glycoprotein I antibodies are normal.

## 2019-10-15 ENCOUNTER — APPOINTMENT (OUTPATIENT)
Dept: CARDIOLOGY | Facility: CLINIC | Age: 54
End: 2019-10-15
Payer: MEDICARE

## 2019-10-15 PROCEDURE — 93000 ELECTROCARDIOGRAM COMPLETE: CPT

## 2019-10-15 PROCEDURE — 99213 OFFICE O/P EST LOW 20 MIN: CPT

## 2019-11-04 ENCOUNTER — APPOINTMENT (OUTPATIENT)
Dept: SURGERY | Facility: CLINIC | Age: 54
End: 2019-11-04
Payer: MEDICARE

## 2019-11-04 VITALS
DIASTOLIC BLOOD PRESSURE: 88 MMHG | TEMPERATURE: 97.8 F | WEIGHT: 268 LBS | HEIGHT: 76 IN | BODY MASS INDEX: 32.63 KG/M2 | HEART RATE: 91 BPM | SYSTOLIC BLOOD PRESSURE: 150 MMHG

## 2019-11-04 PROCEDURE — 46600 DIAGNOSTIC ANOSCOPY SPX: CPT

## 2019-11-04 PROCEDURE — 99203 OFFICE O/P NEW LOW 30 MIN: CPT

## 2019-11-04 NOTE — HISTORY OF PRESENT ILLNESS
[FreeTextEntry1] : Patient is a 53M who presents with approximately 1 year of worsening left groin, genital and buttock lesions.  He states that they have been slowly getting larger and more numerous.  He has not had this happen to him in the past.  He is unsure of his HIV status.  He has been sexually active with 2 women in the last 10 years and does not always use protection.  Of note, he has a history of DVT but a negative hypercoaguable work up. He denies other symptoms including fevers, chills, nausea, vomiting, abdominal pain, constipation, diarrhea, blood in his stool or unexpected weight loss.  Patient denies a family history of colon cancer rectal cancer or inflammatory bowel disease.  Patient has never had a colonoscopy and is in the process of completing a cologuard exam

## 2019-11-04 NOTE — PHYSICAL EXAM
[Abdomen Masses] : No abdominal masses [Abdomen Tenderness] : ~T No ~M abdominal tenderness [No HSM] : no hepatosplenomegaly [Excoriation] : no perianal excoriation [Fistula] : no fistulas [Wart] : no warts [Ulcer ___ cm] : no ulcers [Tender, Swollen] : nontender, non-swollen [Thrombosed] : that was not thrombosed [Skin Tags] : there were no residual hemorrhoidal skin tags seen [Normal] : was normal [None] : there was no rectal mass  [Respiratory Effort] : normal respiratory effort [Normal Rate and Rhythm] : normal rate and rhythm [de-identified] : well healed gastric bypass incisions [de-identified] : external examination reveals multiple flat condyloma extending from the left groin onto the anterior aspect of the left buttock.  No condyloma of the perianal skin. [de-identified] : awake, alert and in no acute distress

## 2019-11-04 NOTE — ASSESSMENT
[FreeTextEntry1] : 53M with anogenital condyloma\par \par I discussed the pathology with the patient, the association with HPV and the risk of developing squamous cell carcinoma.  I recommended HIV blood testing and evaluation by a urologist given the extension to the scrotum and penis.  Patient will need fulguration and excision of the lesions.  This can be done together with urology if needed.  The patient will be referred to Dr. Richardson.  We will see the patient back in 1 month for follow up.

## 2019-11-04 NOTE — PROCEDURE
[FreeTextEntry1] : ROMIE and anoscopy performed.  No masses, normal sphincter tone, grade I hemorrhoids and no visible or palpable internal condyloma.

## 2019-11-11 ENCOUNTER — APPOINTMENT (OUTPATIENT)
Dept: VASCULAR SURGERY | Facility: CLINIC | Age: 54
End: 2019-11-11
Payer: MEDICARE

## 2019-11-11 VITALS
DIASTOLIC BLOOD PRESSURE: 86 MMHG | HEIGHT: 76 IN | BODY MASS INDEX: 32.63 KG/M2 | WEIGHT: 268 LBS | SYSTOLIC BLOOD PRESSURE: 140 MMHG

## 2019-11-11 PROCEDURE — 93970 EXTREMITY STUDY: CPT

## 2019-11-11 PROCEDURE — 99213 OFFICE O/P EST LOW 20 MIN: CPT

## 2019-11-11 NOTE — DATA REVIEWED
[FreeTextEntry1] : I performed a venous duplex which was medically necessary to evaluate for resolution of thrombosis. It showed no evidence of DVT or phlebitis in the lower extremities bilaterally.

## 2019-11-11 NOTE — HISTORY OF PRESENT ILLNESS
[FreeTextEntry1] : 52 y/o gentleman with h/o right GSV superficial phlebitis extending into the saphenofemoral junction, was treated with 3 months of Xarelto, and Xarelto discontinued in July 2019, was seen in ED in August 2019 for left ankle pain and swelling, duplex showed phlebitis in the varicosities at the ankle,was prescribed Xarelto.

## 2019-11-11 NOTE — ASSESSMENT
[FreeTextEntry1] : 52 y/o gentleman with h/o right GSV superficial phlebitis extending into the saphenofemoral junction, was treated with 3 months of Xarelto, and Xarelto discontinued in July 2019, was seen in ED in August 2019 for left ankle pain and swelling, duplex showed phlebitis in the varicosities at the ankle,was prescribed Xarelto. He was seen by hematology and hypercoagulable work up was negative.\par I performed a venous duplex which was medically necessary to evaluate for resolution of thrombosis. It showed no evidence of DVT or phlebitis in the lower extremities bilaterally.\par I have informed him of the test results, he was advised to discontinue anticoagulation and see me back in 6 months for repeat venous duplex.

## 2019-12-02 ENCOUNTER — APPOINTMENT (OUTPATIENT)
Dept: SURGERY | Facility: CLINIC | Age: 54
End: 2019-12-02
Payer: MEDICARE

## 2019-12-02 VITALS
BODY MASS INDEX: 32.27 KG/M2 | TEMPERATURE: 98.2 F | HEART RATE: 84 BPM | DIASTOLIC BLOOD PRESSURE: 86 MMHG | HEIGHT: 76 IN | WEIGHT: 265 LBS | SYSTOLIC BLOOD PRESSURE: 132 MMHG

## 2019-12-02 PROCEDURE — 99212 OFFICE O/P EST SF 10 MIN: CPT

## 2019-12-02 NOTE — HISTORY OF PRESENT ILLNESS
[FreeTextEntry1] : Patient is a 53M who presents for follow up of condyloma.  He has approximately 1 year of worsening left groin, genital and buttock lesions.  He states that they have been slowly getting larger and more numerous.  He has not had this happen to him in the past.  He was tested and found to be HIV negative.  He has been sexually active with 2 women in the last 10 years and does not always use protection.  Of note, he has a history of DVT but a negative hypercoaguable work up. He denies other symptoms including fevers, chills, nausea, vomiting, abdominal pain, constipation, diarrhea, blood in his stool or unexpected weight loss.  Patient denies a family history of colon cancer rectal cancer or inflammatory bowel disease.  Patient has never had a colonoscopy and is in the process of completing a cologuard exam.  He has not seen the urologist yet.

## 2019-12-02 NOTE — ASSESSMENT
[FreeTextEntry1] : 54M with anogentical condyloma.\par \par I discussed the pathology with the patient, the association with HPV and the risk of developing squamous cell carcinoma. He has had negative HIV blood testing.  He will follow up after he has seen urology. Patient will need fulguration and excision of the lesions. This can be done together with urology if needed. The patient will be referred to Dr. Richardson.

## 2019-12-02 NOTE — PHYSICAL EXAM
[Abdomen Masses] : No abdominal masses [Abdomen Tenderness] : ~T No ~M abdominal tenderness [No HSM] : no hepatosplenomegaly [Excoriation] : no perianal excoriation [Fistula] : no fistulas [Wart] : no warts [Ulcer ___ cm] : no ulcers [Tender, Swollen] : nontender, non-swollen [Thrombosed] : that was not thrombosed [Skin Tags] : there were no residual hemorrhoidal skin tags seen [Normal] : was normal [Respiratory Effort] : normal respiratory effort [None] : there was no rectal mass  [Normal Rate and Rhythm] : normal rate and rhythm [de-identified] : well healed gastric bypass incisions [de-identified] : external examination reveals multiple flat condyloma extending from the left groin onto the anterior aspect of the left buttock.  No condyloma of the perianal skin. [de-identified] : awake, alert and in no acute distress

## 2020-01-06 ENCOUNTER — APPOINTMENT (OUTPATIENT)
Dept: UROLOGY | Facility: CLINIC | Age: 55
End: 2020-01-06
Payer: MEDICARE

## 2020-01-06 PROCEDURE — 99203 OFFICE O/P NEW LOW 30 MIN: CPT

## 2020-01-06 NOTE — HISTORY OF PRESENT ILLNESS
[FreeTextEntry1] : Patient is a 53M who presents for evaluation of condyloma-- Referred by Dr Chambers. He has approximately 2 year of worsening left groin, genital and buttock lesions. He states that they have been slowly getting larger and more numerous. He has not had this happen to him in the past. He was tested and found to be HIV negative. He has been sexually active with 2 women in the last 10 years and does not always use protection. Of note, he has a history of DVT but a negative hypercoaguable work up. He denies other symptoms including fevers, chills, nausea, vomiting, abdominal pain, constipation, diarrhea, blood in his stool or unexpected weight loss. Patient denies a family history of colon cancer rectal cancer or inflammatory bowel disease. Patient has never had a colonoscopy and is in the process of completing a cologuard exam.

## 2020-01-06 NOTE — PHYSICAL EXAM
[General Appearance - Well Developed] : well developed [General Appearance - Well Nourished] : well nourished [Well Groomed] : well groomed [Normal Appearance] : normal appearance [General Appearance - In No Acute Distress] : no acute distress [Edema] : no peripheral edema [Respiration, Rhythm And Depth] : normal respiratory rhythm and effort [Exaggerated Use Of Accessory Muscles For Inspiration] : no accessory muscle use [Abdomen Soft] : soft [Abdomen Tenderness] : non-tender [Costovertebral Angle Tenderness] : no ~M costovertebral angle tenderness [Urethral Meatus] : meatus normal [Urinary Bladder Findings] : the bladder was normal on palpation [Testes Mass (___cm)] : there were no testicular masses [FreeTextEntry1] : warts at the left and right base of th penis and left inguinal crease as well as the buttocks [Normal Station and Gait] : the gait and station were normal for the patient's age [Skin Color & Pigmentation] : normal skin color and pigmentation [] : no rash [No Focal Deficits] : no focal deficits [Oriented To Time, Place, And Person] : oriented to person, place, and time [Affect] : the affect was normal [Mood] : the mood was normal [No Palpable Adenopathy] : no palpable adenopathy [Not Anxious] : not anxious

## 2020-01-09 ENCOUNTER — OTHER (OUTPATIENT)
Age: 55
End: 2020-01-09

## 2020-01-09 ENCOUNTER — APPOINTMENT (OUTPATIENT)
Dept: VASCULAR SURGERY | Facility: CLINIC | Age: 55
End: 2020-01-09
Payer: MEDICARE

## 2020-01-09 PROCEDURE — 93970 EXTREMITY STUDY: CPT

## 2020-01-09 PROCEDURE — 99213 OFFICE O/P EST LOW 20 MIN: CPT

## 2020-01-09 NOTE — ASSESSMENT
[FreeTextEntry1] : 53 y/o gentleman with h/o right GSV superficial phlebitis extending into the saphenofemoral junction, was treated with 3 months of Xarelto, and Xarelto discontinued in July 2019, was seen in ED in August 2019 for left ankle pain and swelling, duplex showed phlebitis in the varicosities at the ankle,was prescribed Xarelto which was discontinued in November 2019. \par I performed a venous duplex which was medically necessary to evaluate for resolution of thrombosis. It showed no evidence of DVT or phlebitis in the left lower extremity.\par I have informed him of the test results, he was advised follow  up in one years time. He was instructed to comply with compression stockings and take a break during long car drives.

## 2020-01-09 NOTE — DATA REVIEWED
[FreeTextEntry1] : I performed a venous duplex which was medically necessary to evaluate for resolution of thrombosis. It showed no evidence of DVT or phlebitis in the left lower extremity.

## 2020-01-09 NOTE — HISTORY OF PRESENT ILLNESS
[FreeTextEntry1] : 55 y/o gentleman with h/o right GSV superficial phlebitis extending into the saphenofemoral junction, was treated with 3 months of Xarelto, and Xarelto discontinued in July 2019, was seen in ED in August 2019 for left ankle pain and swelling, duplex showed phlebitis in the varicosities at the ankle,was prescribed Xarelto which was discontinued in November 2019.

## 2020-01-27 ENCOUNTER — APPOINTMENT (OUTPATIENT)
Dept: SURGERY | Facility: CLINIC | Age: 55
End: 2020-01-27

## 2020-01-28 ENCOUNTER — APPOINTMENT (OUTPATIENT)
Dept: PLASTIC SURGERY | Facility: CLINIC | Age: 55
End: 2020-01-28
Payer: MEDICARE

## 2020-01-28 VITALS — WEIGHT: 275 LBS | BODY MASS INDEX: 33.49 KG/M2 | HEIGHT: 76 IN

## 2020-01-28 DIAGNOSIS — M79.643 PAIN IN UNSPECIFIED HAND: ICD-10-CM

## 2020-01-28 PROCEDURE — 99203 OFFICE O/P NEW LOW 30 MIN: CPT

## 2020-01-28 NOTE — PHYSICAL EXAM
[de-identified] : has bilateral hand FROM   mild tenderness R>L basal joint   AIN intact UN and RN also intact  thenaratrophy B/L [NI] : Normal

## 2020-01-28 NOTE — HISTORY OF PRESENT ILLNESS
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

## 2020-01-28 NOTE — ASSESSMENT
[FreeTextEntry1] : somewhat vague complaints of bilateral hands as described \par \par suggest B/L hadn xrays / EMG\par medrol dose pack\par \par f/u in one month

## 2020-01-30 ENCOUNTER — OTHER (OUTPATIENT)
Age: 55
End: 2020-01-30

## 2020-02-11 ENCOUNTER — APPOINTMENT (OUTPATIENT)
Dept: CARDIOLOGY | Facility: CLINIC | Age: 55
End: 2020-02-11

## 2020-02-19 ENCOUNTER — APPOINTMENT (OUTPATIENT)
Dept: UROLOGY | Facility: CLINIC | Age: 55
End: 2020-02-19
Payer: MEDICARE

## 2020-02-19 ENCOUNTER — FORM ENCOUNTER (OUTPATIENT)
Age: 55
End: 2020-02-19

## 2020-02-19 VITALS — BODY MASS INDEX: 33.49 KG/M2 | WEIGHT: 275 LBS | HEIGHT: 76 IN

## 2020-02-19 DIAGNOSIS — A63.0 ANOGENITAL (VENEREAL) WARTS: ICD-10-CM

## 2020-02-19 LAB
BILIRUB UR QL STRIP: NORMAL
CLARITY UR: CLEAR
COLLECTION METHOD: NORMAL
GLUCOSE UR-MCNC: NORMAL
HCG UR QL: NORMAL EU/DL
HGB UR QL STRIP.AUTO: NORMAL
KETONES UR-MCNC: NORMAL
LEUKOCYTE ESTERASE UR QL STRIP: NORMAL
NITRITE UR QL STRIP: NORMAL
PH UR STRIP: 6
PROT UR STRIP-MCNC: NORMAL
SP GR UR STRIP: 1.01

## 2020-02-19 PROCEDURE — 81003 URINALYSIS AUTO W/O SCOPE: CPT | Mod: QW

## 2020-02-19 PROCEDURE — 99214 OFFICE O/P EST MOD 30 MIN: CPT

## 2020-02-19 RX ORDER — FUROSEMIDE 40 MG/1
40 TABLET ORAL
Refills: 0 | Status: DISCONTINUED | COMMUNITY
End: 2020-02-19

## 2020-02-19 RX ORDER — ATORVASTATIN CALCIUM 10 MG/1
10 TABLET, FILM COATED ORAL
Refills: 0 | Status: DISCONTINUED | COMMUNITY
End: 2020-02-19

## 2020-02-20 ENCOUNTER — OUTPATIENT (OUTPATIENT)
Dept: OUTPATIENT SERVICES | Facility: HOSPITAL | Age: 55
LOS: 1 days | Discharge: HOME | End: 2020-02-20
Payer: MEDICARE

## 2020-02-20 DIAGNOSIS — Z96.642 PRESENCE OF LEFT ARTIFICIAL HIP JOINT: Chronic | ICD-10-CM

## 2020-02-20 DIAGNOSIS — M79.643 PAIN IN UNSPECIFIED HAND: ICD-10-CM

## 2020-02-20 DIAGNOSIS — Z98.890 OTHER SPECIFIED POSTPROCEDURAL STATES: Chronic | ICD-10-CM

## 2020-02-20 PROCEDURE — 73130 X-RAY EXAM OF HAND: CPT | Mod: 26,50

## 2020-02-24 ENCOUNTER — APPOINTMENT (OUTPATIENT)
Dept: CARDIOLOGY | Facility: CLINIC | Age: 55
End: 2020-02-24
Payer: MEDICARE

## 2020-02-24 PROCEDURE — 99214 OFFICE O/P EST MOD 30 MIN: CPT

## 2020-02-24 PROCEDURE — 93000 ELECTROCARDIOGRAM COMPLETE: CPT

## 2020-03-03 ENCOUNTER — APPOINTMENT (OUTPATIENT)
Dept: PLASTIC SURGERY | Facility: CLINIC | Age: 55
End: 2020-03-03
Payer: MEDICARE

## 2020-03-03 PROCEDURE — 99212 OFFICE O/P EST SF 10 MIN: CPT

## 2020-03-06 ENCOUNTER — APPOINTMENT (OUTPATIENT)
Dept: VASCULAR SURGERY | Facility: CLINIC | Age: 55
End: 2020-03-06
Payer: MEDICARE

## 2020-03-06 PROCEDURE — 93970 EXTREMITY STUDY: CPT

## 2020-03-17 NOTE — HISTORY OF PRESENT ILLNESS
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 States that his pain and locking is much improved after the 6-day course of oral steroid taper. Denies any paresthesias, night pain. 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 Also had hand x-rays 2/20/2020 which showed mild to moderate degenerative changes and osteopenia without acute osseous abnormality

## 2020-03-17 NOTE — HISTORY OF PRESENT ILLNESS
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 States that his pain and locking is much improved after the 6-day course of oral steroid taper. Denies any paresthesias, night pain. 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 Also had hand x-rays 2/20/2020 which showed mild to moderate degenerative changes and osteopenia without acute osseous abnormality

## 2020-03-17 NOTE — PHYSICAL EXAM
[NI] : Normal [de-identified] : Has bilateral hand FROM. Mild tenderness R>L basal joint. Negative grind test bilaterally.  AIN intact UN and RN also intact thenar atrophy B/L .\par Capillary refill ~3 sec in all digits.\par Sensation intact to light touch in all digits.\par No triggering in any fingers.

## 2020-03-17 NOTE — PHYSICAL EXAM
[NI] : Normal [de-identified] : Has bilateral hand FROM. Mild tenderness R>L basal joint. Negative grind test bilaterally.  AIN intact UN and RN also intact thenar atrophy B/L .\par Capillary refill ~3 sec in all digits.\par Sensation intact to light touch in all digits.\par No triggering in any fingers.

## 2020-03-17 NOTE — ASSESSMENT
[FreeTextEntry1] : 54 year old RHD male presenting with somewhat vague complaints of bilateral hand pain and "locking" that has since improved after a medrol dose pack. EMG demonstrating mild/moderate carpal tunnel syndrome and X-ray showing mild/moderate degenerative changes.\par - trial of nighttime splinting\par - f/u PRN

## 2020-03-17 NOTE — REVIEW OF SYSTEMS
[Arthralgias] : arthralgias [Fever] : no fever [Chills] : no chills [Chest Pain] : no chest pain [Shortness Of Breath] : no shortness of breath [Abdominal Pain] : no abdominal pain [Vomiting] : no vomiting [Diarrhea] : no diarrhea

## 2020-04-23 NOTE — PHYSICAL EXAM
[General Appearance - Well Developed] : well developed [General Appearance - Well Nourished] : well nourished [Normal Appearance] : normal appearance [General Appearance - In No Acute Distress] : no acute distress [Abdomen Soft] : soft [Abdomen Tenderness] : non-tender [Costovertebral Angle Tenderness] : no ~M costovertebral angle tenderness [Penis Abnormality] : normal circumcised penis [Skin Color & Pigmentation] : normal skin color and pigmentation [Edema] : no peripheral edema [] : no respiratory distress [Respiration, Rhythm And Depth] : normal respiratory rhythm and effort [Exaggerated Use Of Accessory Muscles For Inspiration] : no accessory muscle use [Oriented To Time, Place, And Person] : oriented to person, place, and time [Affect] : the affect was normal [Not Anxious] : not anxious [Mood] : the mood was normal [Normal Station and Gait] : the gait and station were normal for the patient's age [No Focal Deficits] : no focal deficits [No Palpable Adenopathy] : no palpable adenopathy [Scrotum] : the scrotum was normal [Testes Tenderness] : no tenderness of the testes [FreeTextEntry1] : 2 x 1 cm wart like lesions at the right  base of the penile scrotal junction, a linear arrangement wartlike lesions in the left groin from the peritoneal junction and the inner 5, several small perin warts, several small warts along the inner right thigh.

## 2020-04-23 NOTE — ASSESSMENT
[FreeTextEntry1] : #1. Cutaneous HPV\par #2. ANO- genital HPV\par #3. Penile HPV\par \par Plan\par -Advise dermatology consult for evaluation and treatment of cutaneous and perineal warts.\par -4 penile warts== Condylox 0.5%  b.i.d., 3 days on and 4 days off for minimum of one month cycle\par -Return 2 months

## 2020-04-23 NOTE — HISTORY OF PRESENT ILLNESS
[Urinary Frequency] : urinary frequency [Nocturia] : nocturia [FreeTextEntry1] : 54-year-old male referred from Dr. Richardson for a history of genital warts. Patient reports several months history of wartlike lesions on the penis, under the scrotum, and left groin. He reports bleeding when he scratches them. Review of previous notes reports episodes of unprotected sexual activity. [Straining] : no straining [Weak Stream] : no weak stream [Hematuria - Gross] : no gross hematuria [Dysuria] : no dysuria [Fever] : no fever [Anorexia] : no anorexia

## 2020-05-11 ENCOUNTER — APPOINTMENT (OUTPATIENT)
Dept: VASCULAR SURGERY | Facility: CLINIC | Age: 55
End: 2020-05-11

## 2020-05-19 ENCOUNTER — APPOINTMENT (OUTPATIENT)
Dept: CARDIOLOGY | Facility: CLINIC | Age: 55
End: 2020-05-19

## 2020-06-05 NOTE — ED ADULT TRIAGE NOTE - NS ED NURSE BANDS TYPE
Process Group Note    PATIENT'S NAME: Katherine Villalpando  MRN:   [id number]  :   1978  ACCT. NUMBER: [id number]  Becca Reyna, [facility]  June 5, 2020   Name band;

## 2020-08-02 ENCOUNTER — TRANSCRIPTION ENCOUNTER (OUTPATIENT)
Age: 55
End: 2020-08-02

## 2020-08-11 ENCOUNTER — TRANSCRIPTION ENCOUNTER (OUTPATIENT)
Age: 55
End: 2020-08-11

## 2020-08-11 ENCOUNTER — RECORD ABSTRACTING (OUTPATIENT)
Age: 55
End: 2020-08-11

## 2020-08-11 RX ORDER — METFORMIN HYDROCHLORIDE 500 MG/1
500 TABLET, COATED ORAL DAILY
Refills: 0 | Status: ACTIVE | COMMUNITY

## 2020-08-20 ENCOUNTER — APPOINTMENT (OUTPATIENT)
Dept: UROLOGY | Facility: CLINIC | Age: 55
End: 2020-08-20

## 2020-08-25 ENCOUNTER — APPOINTMENT (OUTPATIENT)
Dept: CARDIOLOGY | Facility: CLINIC | Age: 55
End: 2020-08-25

## 2020-08-25 ENCOUNTER — APPOINTMENT (OUTPATIENT)
Dept: CARDIOLOGY | Facility: CLINIC | Age: 55
End: 2020-08-25
Payer: MEDICARE

## 2020-08-25 PROCEDURE — 93320 DOPPLER ECHO COMPLETE: CPT

## 2020-08-25 PROCEDURE — 93325 DOPPLER ECHO COLOR FLOW MAPG: CPT

## 2020-08-25 PROCEDURE — 93351 STRESS TTE COMPLETE: CPT

## 2020-09-08 NOTE — H&P ADULT - NSHPPHYSICALEXAM_GEN_ALL_CORE
Fax received from [employer] regarding medical supplies for patient. Signed and dated by Dr Friend and [phone]. Fax placed in scanning.    PHYSICAL EXAM:  GENERAL: NAD, well-developed  HEAD:  Atraumatic, Normocephalic  EYES: EOMI, PERRLA, conjunctiva and sclera clear  NECK: Supple, No JVD  CHEST/LUNG: Clear to auscultation bilaterally; No wheeze  HEART: Regular rate and rhythm; No murmurs, rubs, or gallops  ABDOMEN: Soft, Nontender, Nondistended; Bowel sounds present  EXTREMITIES:  2+ Peripheral Pulses, No clubbing, cyanosis, or edema  PSYCH: AAOx3  NEUROLOGY: non-focal  SKIN: No rashes or lesions PHYSICAL EXAM:  GENERAL: Middle aged M, lying in bed, in NAD, well-developed  HEAD:  Atraumatic, Normocephalic  EYES: EOMI, PERRLA, conjunctiva and sclera clear  NECK: Supple, No JVD  CHEST/LUNG: Clear to auscultation bilaterally; No wheeze  HEART: Regular rate and rhythm; No murmurs, rubs, or gallops  ABDOMEN: Soft, Nontender, Nondistended; Bowel sounds present  EXTREMITIES:  2+ Peripheral Pulses, No clubbing, cyanosis, or edema  PSYCH: AAOx3  NEUROLOGY: non-focal  SKIN: No rashes or lesions

## 2020-09-19 ENCOUNTER — RESULT REVIEW (OUTPATIENT)
Age: 55
End: 2020-09-19

## 2020-09-19 ENCOUNTER — OUTPATIENT (OUTPATIENT)
Dept: OUTPATIENT SERVICES | Facility: HOSPITAL | Age: 55
LOS: 1 days | Discharge: HOME | End: 2020-09-19
Payer: MEDICARE

## 2020-09-19 DIAGNOSIS — Z96.642 PRESENCE OF LEFT ARTIFICIAL HIP JOINT: Chronic | ICD-10-CM

## 2020-09-19 DIAGNOSIS — Z98.890 OTHER SPECIFIED POSTPROCEDURAL STATES: Chronic | ICD-10-CM

## 2020-09-19 DIAGNOSIS — R22.2 LOCALIZED SWELLING, MASS AND LUMP, TRUNK: ICD-10-CM

## 2020-09-19 PROCEDURE — 71260 CT THORAX DX C+: CPT | Mod: 26

## 2020-12-23 ENCOUNTER — TRANSCRIPTION ENCOUNTER (OUTPATIENT)
Age: 55
End: 2020-12-23

## 2021-01-01 NOTE — ED PROVIDER NOTE - CARE PROVIDER_API CALL
NICU Progress Note    This is a  male infant born 2021  3:53 AM at Gestational Age: 34w1d now at age: 4 day old  Patient Active Problem List    Diagnosis Date Noted   • Sickle cell trait (CMS/HCC) 2021     Priority: Low   • Prematurity 2021     Priority: Low     Subjective: Remains stable in room air, no alarms recorded. Tolerating feeds of Neosure 22kcal/oz and working on PO- PO amounts have dropped off as enteral feeds have advanced. Weight change: -30 g. Bili stable and remains below light level.     Objective:  Vitals Last Value 24-Hour Range   Temperature 98 °F (36.7 °C) (21 1030) Temp  Min: 98 °F (36.7 °C)  Max: 98.4 °F (36.9 °C)   Pulse 167 (21 1030) Pulse  Min: 146  Max: 177   Respiratory 51 (21 1030) Resp  Min: 35  Max: 64   Non-Invasive Blood Pressure 68/31 (21 0730) BP  Min: 67/43  Max: 76/45   Arterial Blood Pressure   No data recorded   Mean Arterial Pressure (!) 44 (21 0730) MAP (mmHg)  Min: 44  Max: 54   Pulse Oximetry 95 % (21 1030) SpO2  Min: 95 %  Max: 99 %     Resp: RA  Last Apnea:    and intervention:      Physical Exam:  Birthweight:  4 lb 11.5 oz (2140 g) with weight change of -6% since birth  Current weight:   Patient Vitals for the past 24 hrs:   Weight   21 1930 (!) 2010 g    with a weight change of Weight change: -30 g overnight    Gen: Well appearing male infant, reactive with exam. Lying in Dad's lap.   Skin:  Pink, well perfused, no edema. Mild jaundice.  HEENT: AFOSF, normal facies, no anomalies/bruising noted.  Eyes open without drainage. NG in place.   Lungs:  Clear to auscultation, no retractions.  CV:  RRR with no murmur. Brisk cap refill.   Abdomen:  Soft, no masses, nondistended, active bowel sounds.  Neuro: Normal tone, movement    Fluids:  Intake/Output  Report      59     P.O. (mL/kg) 121 (60.2) 17 (8.46)    I.V. (mL/kg)      NG/GT (mL/kg) 134 (66.67) 57 (28.36)     Total Intake(mL/kg) 255 (126.87) 74 (36.82)    Urine (mL/kg/hr)      Total Output(mL/kg)      Net +255 +74          Urine Occurrence 6 x     Stool Occurrence 3 x           Neosure 22kcal/oz 37 ml q3h --> 138 ml/kg/day (using BW)  PO 47%    Adequate voiding and stooling  Last Void:  1 (21 2230) x6  Last Stool:  1 (21 1630) x5    Labs:    Recent Results (from the past 24 hour(s))   Bilirubin, Total    Collection Time: 21  5:53 AM   Result Value Ref Range    Bilirubin, Total 8.4 (H) 2.0 - 6.0 mg/dL       Medications:  Current Facility-Administered Medications   Medication Dose Route Frequency Provider Last Rate Last Admin   • hepatitis B IMMUNE GLOBULIN (HYPERHEP B) injection 0.5 mL  0.5 mL Intramuscular Once PRN Mandi Polk MD       • hepatitis B (ENGERIX-B) 10 MCG/0.5ML vaccine 10 mcg  0.5 mL Intramuscular Once Mandi Polk MD           Impression:   male infant at 34 1/7 weeks, now corrected to 34w 5d   Prematurity  Respiratory Distress - HFNC> RA (resolved)   Eval for sepsis (resolved)   Management of nutrition   Mild metabolic acidosis (resolved)   Immature feeding    Plan:  Resp:  Stable in RA. Will monitor for signs of respiratory distress.   CV:  Monitor HR, BP, and perfusion.  FEN: Mom is pumping and dumping due to THC use. Advance Neosure 22 kcal/oz to 150 ml/kg/day. Will defer further fortification at this time as infant is melissa 4 days old and weight loss is expected. Encourage PO. Monitor growth, lytes, and PO intake. Start poly-vi-sol and tanmay-in-sol per dietary recs   ID: Monitor clinically.   Heme: Hct stable. Follow bilirubin tomorrow to ensure downward trend.   Neuro: Head ultrasound is not indicated for this infant.  Meconium pending.     Current antibiotic status:  None      Parents updated at bedside.     I saw and examined Johnnie Ambrosio on 2021 at 10:58 AM and patient requires: NICU Intensive Care: Constant monitoring by health care team, Enteral/ Parental  nutritional adjustments and Frequent lab monitoring   Gennaro Ruiz)  Surgery; Vascular Surgery  [address] [place] [address]  Phone: [phone]  Fax: [phone]  Follow Up Time: 1-3 Days    Sol Adams)  Hematology; Internal Medicine; Medical Oncology  [address] [place] [address]  Phone: [phone]  Fax: [phone]  Follow Up Time: 1-3 Days

## 2021-03-08 ENCOUNTER — APPOINTMENT (OUTPATIENT)
Dept: CARDIOLOGY | Facility: CLINIC | Age: 56
End: 2021-03-08

## 2021-03-11 ENCOUNTER — APPOINTMENT (OUTPATIENT)
Dept: VASCULAR SURGERY | Facility: CLINIC | Age: 56
End: 2021-03-11
Payer: MEDICARE

## 2021-03-11 PROCEDURE — 99213 OFFICE O/P EST LOW 20 MIN: CPT

## 2021-03-11 PROCEDURE — 99072 ADDL SUPL MATRL&STAF TM PHE: CPT

## 2021-03-11 PROCEDURE — 93970 EXTREMITY STUDY: CPT

## 2021-03-11 NOTE — HISTORY OF PRESENT ILLNESS
[FreeTextEntry1] : 54 y/o gentleman with h/o right GSV superficial phlebitis extending into the saphenofemoral junction, was treated with 3 months of Xarelto, and Xarelto discontinued in July 2019.\par \par He presents for leg swelling, concerned of DVT.

## 2021-03-11 NOTE — DATA REVIEWED
[FreeTextEntry1] : I performed a venous duplex which was medically necessary to evaluate for DVT. It showed no evidence of DVT or phlebitis in the lower extremities bilaterally.

## 2021-04-07 ENCOUNTER — APPOINTMENT (OUTPATIENT)
Dept: CARDIOLOGY | Facility: CLINIC | Age: 56
End: 2021-04-07
Payer: MEDICARE

## 2021-04-07 ENCOUNTER — LABORATORY RESULT (OUTPATIENT)
Age: 56
End: 2021-04-07

## 2021-04-07 VITALS
TEMPERATURE: 97.9 F | BODY MASS INDEX: 30.44 KG/M2 | DIASTOLIC BLOOD PRESSURE: 80 MMHG | HEIGHT: 76 IN | SYSTOLIC BLOOD PRESSURE: 122 MMHG | WEIGHT: 250 LBS | HEART RATE: 80 BPM

## 2021-04-07 PROCEDURE — 93000 ELECTROCARDIOGRAM COMPLETE: CPT

## 2021-04-07 PROCEDURE — 99072 ADDL SUPL MATRL&STAF TM PHE: CPT

## 2021-04-07 PROCEDURE — 99213 OFFICE O/P EST LOW 20 MIN: CPT

## 2021-04-07 RX ORDER — RIVAROXABAN 15 MG/1
15 TABLET, FILM COATED ORAL TWICE DAILY
Qty: 42 | Refills: 0 | Status: DISCONTINUED | COMMUNITY
Start: 2019-03-28 | End: 2021-04-07

## 2021-04-07 RX ORDER — RIVAROXABAN 20 MG/1
20 TABLET, FILM COATED ORAL
Qty: 60 | Refills: 1 | Status: DISCONTINUED | COMMUNITY
Start: 2019-05-09 | End: 2021-04-07

## 2021-04-07 RX ORDER — METFORMIN HYDROCHLORIDE 625 MG/1
TABLET ORAL
Refills: 0 | Status: DISCONTINUED | COMMUNITY
End: 2021-04-07

## 2021-04-07 RX ORDER — RIVAROXABAN 20 MG/1
20 TABLET, FILM COATED ORAL
Qty: 30 | Refills: 6 | Status: DISCONTINUED | COMMUNITY
Start: 2019-04-09 | End: 2021-04-07

## 2021-04-07 RX ORDER — LAMOTRIGINE 100 MG/1
100 TABLET ORAL TWICE DAILY
Refills: 0 | Status: DISCONTINUED | COMMUNITY
End: 2021-04-07

## 2021-06-23 NOTE — ED ADULT NURSE NOTE - CAS TRG GEN SKIN COLOR
Based on the patients symptoms and/or clinical findings the procedure is clinically indicated and should not be delayed due to Covid 19. Normal for race

## 2021-09-09 ENCOUNTER — APPOINTMENT (OUTPATIENT)
Dept: VASCULAR SURGERY | Facility: CLINIC | Age: 56
End: 2021-09-09
Payer: MEDICARE

## 2021-09-09 VITALS
DIASTOLIC BLOOD PRESSURE: 89 MMHG | WEIGHT: 250 LBS | SYSTOLIC BLOOD PRESSURE: 135 MMHG | HEIGHT: 76 IN | TEMPERATURE: 95.4 F | HEART RATE: 112 BPM | BODY MASS INDEX: 30.44 KG/M2

## 2021-09-09 PROCEDURE — 93970 EXTREMITY STUDY: CPT

## 2021-09-09 PROCEDURE — 99213 OFFICE O/P EST LOW 20 MIN: CPT

## 2021-09-09 NOTE — DATA REVIEWED
[FreeTextEntry1] : I performed a venous duplex which was medically necessary to evaluate for DVT. It showed no evidence of DVT  in the lower extremities bilaterally.There is a superficial phlebitis noted in the right small saphenous vein.

## 2021-09-09 NOTE — ASSESSMENT
[FreeTextEntry1] : 54 y/o gentleman with h/o right GSV superficial phlebitis extending into the saphenofemoral junction, was treated with 3 months of Xarelto, and Xarelto discontinued in July 2019. He presents for leg swelling, concerned of DVT.\par Leg swelling improves with elevation.\par \par I performed a venous duplex which was medically necessary to evaluate for resolution of thrombosis. It showed no evidence of DVT or phlebitis in the lower extremities bilaterally.\par \par I have informed him of the test results, reassured him, advised him to continue with use of compression stockings and see me back on a PRN basis

## 2021-09-09 NOTE — HISTORY OF PRESENT ILLNESS
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

## 2021-10-11 ENCOUNTER — APPOINTMENT (OUTPATIENT)
Dept: CARDIOLOGY | Facility: CLINIC | Age: 56
End: 2021-10-11
Payer: MEDICARE

## 2021-10-11 DIAGNOSIS — Z00.00 ENCOUNTER FOR GENERAL ADULT MEDICAL EXAMINATION W/OUT ABNORMAL FINDINGS: ICD-10-CM

## 2021-10-11 PROCEDURE — 99213 OFFICE O/P EST LOW 20 MIN: CPT

## 2021-10-11 PROCEDURE — 93000 ELECTROCARDIOGRAM COMPLETE: CPT

## 2021-10-12 ENCOUNTER — RESULT CHARGE (OUTPATIENT)
Age: 56
End: 2021-10-12

## 2021-11-14 ENCOUNTER — EMERGENCY (EMERGENCY)
Facility: HOSPITAL | Age: 56
LOS: 0 days | Discharge: HOME | End: 2021-11-14
Attending: STUDENT IN AN ORGANIZED HEALTH CARE EDUCATION/TRAINING PROGRAM | Admitting: STUDENT IN AN ORGANIZED HEALTH CARE EDUCATION/TRAINING PROGRAM
Payer: MEDICARE

## 2021-11-14 VITALS
HEART RATE: 78 BPM | OXYGEN SATURATION: 95 % | SYSTOLIC BLOOD PRESSURE: 133 MMHG | RESPIRATION RATE: 18 BRPM | DIASTOLIC BLOOD PRESSURE: 91 MMHG | TEMPERATURE: 97 F | HEIGHT: 76 IN | WEIGHT: 274.92 LBS

## 2021-11-14 DIAGNOSIS — Z98.890 OTHER SPECIFIED POSTPROCEDURAL STATES: Chronic | ICD-10-CM

## 2021-11-14 DIAGNOSIS — E11.9 TYPE 2 DIABETES MELLITUS WITHOUT COMPLICATIONS: ICD-10-CM

## 2021-11-14 DIAGNOSIS — R06.2 WHEEZING: ICD-10-CM

## 2021-11-14 DIAGNOSIS — Z20.822 CONTACT WITH AND (SUSPECTED) EXPOSURE TO COVID-19: ICD-10-CM

## 2021-11-14 DIAGNOSIS — J18.9 PNEUMONIA, UNSPECIFIED ORGANISM: ICD-10-CM

## 2021-11-14 DIAGNOSIS — Z96.642 PRESENCE OF LEFT ARTIFICIAL HIP JOINT: Chronic | ICD-10-CM

## 2021-11-14 DIAGNOSIS — R05.1 ACUTE COUGH: ICD-10-CM

## 2021-11-14 DIAGNOSIS — J45.909 UNSPECIFIED ASTHMA, UNCOMPLICATED: ICD-10-CM

## 2021-11-14 LAB
ALBUMIN SERPL ELPH-MCNC: 3.4 G/DL — LOW (ref 3.5–5.2)
ALP SERPL-CCNC: 105 U/L — SIGNIFICANT CHANGE UP (ref 30–115)
ALT FLD-CCNC: 41 U/L — SIGNIFICANT CHANGE UP (ref 0–41)
ANION GAP SERPL CALC-SCNC: 13 MMOL/L — SIGNIFICANT CHANGE UP (ref 7–14)
AST SERPL-CCNC: 58 U/L — HIGH (ref 0–41)
BASOPHILS # BLD AUTO: 0.05 K/UL — SIGNIFICANT CHANGE UP (ref 0–0.2)
BASOPHILS NFR BLD AUTO: 1 % — SIGNIFICANT CHANGE UP (ref 0–1)
BILIRUB SERPL-MCNC: 0.2 MG/DL — SIGNIFICANT CHANGE UP (ref 0.2–1.2)
BUN SERPL-MCNC: 11 MG/DL — SIGNIFICANT CHANGE UP (ref 10–20)
CALCIUM SERPL-MCNC: 8.4 MG/DL — LOW (ref 8.5–10.1)
CHLORIDE SERPL-SCNC: 107 MMOL/L — SIGNIFICANT CHANGE UP (ref 98–110)
CO2 SERPL-SCNC: 22 MMOL/L — SIGNIFICANT CHANGE UP (ref 17–32)
CREAT SERPL-MCNC: 1 MG/DL — SIGNIFICANT CHANGE UP (ref 0.7–1.5)
EOSINOPHIL # BLD AUTO: 0.44 K/UL — SIGNIFICANT CHANGE UP (ref 0–0.7)
EOSINOPHIL NFR BLD AUTO: 9 % — HIGH (ref 0–8)
GLUCOSE SERPL-MCNC: 113 MG/DL — HIGH (ref 70–99)
HCT VFR BLD CALC: 44.3 % — SIGNIFICANT CHANGE UP (ref 42–52)
HGB BLD-MCNC: 14.6 G/DL — SIGNIFICANT CHANGE UP (ref 14–18)
IMM GRANULOCYTES NFR BLD AUTO: 0.6 % — HIGH (ref 0.1–0.3)
LYMPHOCYTES # BLD AUTO: 0.75 K/UL — LOW (ref 1.2–3.4)
LYMPHOCYTES # BLD AUTO: 15.4 % — LOW (ref 20.5–51.1)
MCHC RBC-ENTMCNC: 29.4 PG — SIGNIFICANT CHANGE UP (ref 27–31)
MCHC RBC-ENTMCNC: 33 G/DL — SIGNIFICANT CHANGE UP (ref 32–37)
MCV RBC AUTO: 89.3 FL — SIGNIFICANT CHANGE UP (ref 80–94)
MONOCYTES # BLD AUTO: 0.82 K/UL — HIGH (ref 0.1–0.6)
MONOCYTES NFR BLD AUTO: 16.8 % — HIGH (ref 1.7–9.3)
NEUTROPHILS # BLD AUTO: 2.78 K/UL — SIGNIFICANT CHANGE UP (ref 1.4–6.5)
NEUTROPHILS NFR BLD AUTO: 57.2 % — SIGNIFICANT CHANGE UP (ref 42.2–75.2)
NRBC # BLD: 0 /100 WBCS — SIGNIFICANT CHANGE UP (ref 0–0)
PLATELET # BLD AUTO: 176 K/UL — SIGNIFICANT CHANGE UP (ref 130–400)
POTASSIUM SERPL-MCNC: 4.3 MMOL/L — SIGNIFICANT CHANGE UP (ref 3.5–5)
POTASSIUM SERPL-SCNC: 4.3 MMOL/L — SIGNIFICANT CHANGE UP (ref 3.5–5)
PROT SERPL-MCNC: 5.5 G/DL — LOW (ref 6–8)
RAPID RVP RESULT: DETECTED
RBC # BLD: 4.96 M/UL — SIGNIFICANT CHANGE UP (ref 4.7–6.1)
RBC # FLD: 14.7 % — HIGH (ref 11.5–14.5)
RSV RNA SPEC QL NAA+PROBE: DETECTED
SARS-COV-2 RNA SPEC QL NAA+PROBE: SIGNIFICANT CHANGE UP
SODIUM SERPL-SCNC: 142 MMOL/L — SIGNIFICANT CHANGE UP (ref 135–146)
TROPONIN T SERPL-MCNC: <0.01 NG/ML — SIGNIFICANT CHANGE UP
WBC # BLD: 4.87 K/UL — SIGNIFICANT CHANGE UP (ref 4.8–10.8)
WBC # FLD AUTO: 4.87 K/UL — SIGNIFICANT CHANGE UP (ref 4.8–10.8)

## 2021-11-14 PROCEDURE — 99285 EMERGENCY DEPT VISIT HI MDM: CPT

## 2021-11-14 PROCEDURE — 71045 X-RAY EXAM CHEST 1 VIEW: CPT | Mod: 26

## 2021-11-14 PROCEDURE — 93010 ELECTROCARDIOGRAM REPORT: CPT

## 2021-11-14 RX ORDER — IPRATROPIUM/ALBUTEROL SULFATE 18-103MCG
3 AEROSOL WITH ADAPTER (GRAM) INHALATION ONCE
Refills: 0 | Status: COMPLETED | OUTPATIENT
Start: 2021-11-14 | End: 2021-11-14

## 2021-11-14 RX ORDER — LEVOFLOXACIN 5 MG/ML
1 INJECTION, SOLUTION INTRAVENOUS
Qty: 5 | Refills: 0
Start: 2021-11-14 | End: 2021-11-18

## 2021-11-14 RX ORDER — ALBUTEROL 90 UG/1
2 AEROSOL, METERED ORAL
Qty: 1 | Refills: 0
Start: 2021-11-14 | End: 2021-11-20

## 2021-11-14 RX ADMIN — Medication 3 MILLILITER(S): at 12:15

## 2021-11-14 RX ADMIN — Medication 125 MILLIGRAM(S): at 12:15

## 2021-11-14 NOTE — ED PROVIDER NOTE - CLINICAL SUMMARY MEDICAL DECISION MAKING FREE TEXT BOX
I personally evaluated the patient. I reviewed the Resident’s or Physician Assistant’s note (as assigned above), and agree with the findings and plan except as documented in my note. Patient evaluated for cough. I personally evaluated the patient. I reviewed the Resident’s or Physician Assistant’s note (as assigned above), and agree with the findings and plan except as documented in my note. Patient evaluated for cough. Labs, CXR, EKG performed in ED. Given albuterol treatments and steroids for wheezing with marked improvement. Pt well appearing, vs stable. Given abx for consolidation noted on CXR. I have fully discussed the medical management and delivery of care with the patient. I have discussed any available labs, imaging and treatment options with the patient. Patient confirms understanding and has been given detailed return precautions. Patient instructed to return to the ED should symptoms persist or worsen. Patient has demonstrated capacity and has verbalized understanding. Patient is well appearing upon discharge.

## 2021-11-14 NOTE — ED PROVIDER NOTE - WR ORDER ID 1
History  Chief Complaint   Patient presents with    Abdominal Pain     Patient reports abdominal pain and nausea  worsens after eating  ongoing for "awhile"  Diarrhea at times  This is a 79-year-old female who presents with abdominal pain  Over the past week, the patient has been complaining of an epigastric abdominal pain described as burning, constant, nonradiating, waxing and waning in intensity, associated with nausea without vomiting  Her pain is made worse with eating  Denies any alleviating factors  She has experienced similar symptoms in the past   Only abdominal surgery in the past was a   Denies any history of gallstones, kidney stones, alcohol abuse  Denies fever/chills, vomiting, lightheadedness/dizziness, numbness/weakness, headache, change in vision, URI symptoms, neck pain, chest pain, palpitations, shortness of breath, cough, back pain, flank pain, diarrhea, hematochezia, melena, dysuria, hematuria, abnormal vaginal discharge/bleeding  Prior to Admission Medications   Prescriptions Last Dose Informant Patient Reported?  Taking?   acetaminophen (TYLENOL) 500 mg tablet Not Taking at Unknown time  No No   Sig: Take 1 tablet (500 mg total) by mouth every 6 (six) hours as needed for mild pain, moderate pain, headaches or fever   Patient not taking: Reported on 8/15/2019   albuterol (ACCUNEB) 1 25 MG/3ML nebulizer solution Not Taking at Unknown time  No No   Sig: Take 3 mL (1 25 mg total) by nebulization every 6 (six) hours as needed for wheezing   Patient not taking: Reported on 8/15/2019   albuterol (PROVENTIL HFA,VENTOLIN HFA) 90 mcg/act inhaler Not Taking at Unknown time  No No   Sig: Inhale 2 puffs every 6 (six) hours as needed for wheezing   Patient not taking: Reported on 8/15/2019   albuterol (PROVENTIL HFA,VENTOLIN HFA) 90 mcg/act inhaler Not Taking at Unknown time  No No   Sig: Inhale 2 puffs every 4 (four) hours as needed for wheezing or shortness of breath   Patient not taking: Reported on 8/15/2019   omeprazole (PriLOSEC) 20 mg delayed release capsule   No Yes   Sig: Take 1 capsule (20 mg total) by mouth daily      Facility-Administered Medications: None       Past Medical History:   Diagnosis Date    Asthma     Psychiatric disorder        Past Surgical History:   Procedure Laterality Date     SECTION      NO PAST SURGERIES         History reviewed  No pertinent family history  I have reviewed and agree with the history as documented  Social History     Tobacco Use    Smoking status: Current Every Day Smoker     Packs/day: 2 00     Types: Cigarettes    Smokeless tobacco: Never Used   Substance Use Topics    Alcohol use: Yes     Comment: socially     Drug use: Not Currently        Review of Systems   Constitutional: Negative for chills and fever  HENT: Negative for rhinorrhea, sore throat and trouble swallowing  Eyes: Negative for photophobia and visual disturbance  Respiratory: Negative for cough, chest tightness and shortness of breath  Cardiovascular: Negative for chest pain, palpitations and leg swelling  Gastrointestinal: Positive for abdominal pain and nausea  Negative for blood in stool, diarrhea and vomiting  Endocrine: Negative for polyuria  Genitourinary: Negative for dysuria, flank pain, hematuria, vaginal bleeding and vaginal discharge  Musculoskeletal: Negative for back pain and neck pain  Skin: Negative for color change and rash  Allergic/Immunologic: Negative for immunocompromised state  Neurological: Negative for dizziness, weakness, light-headedness, numbness and headaches  All other systems reviewed and are negative  Physical Exam  Physical Exam   Constitutional: Vital signs are normal  She appears well-developed  She is cooperative  No distress  HENT:   Mouth/Throat: Uvula is midline, oropharynx is clear and moist and mucous membranes are normal    Eyes: Pupils are equal, round, and reactive to light  Conjunctivae and EOM are normal    Neck: Trachea normal  No thyroid mass and no thyromegaly present  Cardiovascular: Normal rate, regular rhythm, normal heart sounds, intact distal pulses and normal pulses  No murmur heard  Pulmonary/Chest: Effort normal and breath sounds normal    Abdominal: Soft  Normal appearance and bowel sounds are normal  There is tenderness in the epigastric area and left upper quadrant  There is no rebound, no guarding and no CVA tenderness  Neurological: She is alert  Skin: Skin is warm, dry and intact  Psychiatric: She has a normal mood and affect   Her speech is normal and behavior is normal  Thought content normal        Vital Signs  ED Triage Vitals [08/15/19 1808]   Temperature Pulse Respirations Blood Pressure SpO2   98 3 °F (36 8 °C) 92 18 127/77 96 %      Temp Source Heart Rate Source Patient Position - Orthostatic VS BP Location FiO2 (%)   Temporal Monitor Lying Right arm --      Pain Score       7           Vitals:    08/15/19 1808 08/15/19 2008   BP: 127/77 129/76   Pulse: 92 86   Patient Position - Orthostatic VS: Lying Lying         Visual Acuity      ED Medications  Medications   sucralfate (CARAFATE) oral suspension 1,000 mg (1,000 mg Oral Given 8/15/19 1915)   famotidine (PEPCID) tablet 20 mg (20 mg Oral Given 8/15/19 1915)       Diagnostic Studies  Results Reviewed     Procedure Component Value Units Date/Time    Urine Microscopic [[id number]]  (Abnormal) Collected:  08/15/19 1936    Lab Status:  Final result Specimen:  Urine, Clean Catch Updated:  08/15/19 1952     RBC, UA 4-10 /hpf      WBC, UA 0-1 /hpf      Epithelial Cells Occasional /hpf      Bacteria, UA Occasional /hpf     Comprehensive metabolic panel [[id number]] Collected:  08/15/19 1914    Lab Status:  Final result Specimen:  Blood from Arm, Left Updated:  08/15/19 1946     Sodium 140 mmol/L      Potassium 4 0 mmol/L      Chloride 105 mmol/L      CO2 27 mmol/L      ANION GAP 8 mmol/L      BUN 6 mg/dL Creatinine 0 71 mg/dL      Glucose 86 mg/dL      Calcium 8 8 mg/dL      AST 17 U/L      ALT 24 U/L      Alkaline Phosphatase 97 U/L      Total Protein 7 9 g/dL      Albumin 3 8 g/dL      Total Bilirubin 0 31 mg/dL      eGFR 110 ml/min/1 73sq m     Narrative:       [facility] guidelines for Chronic Kidney Disease (CKD):     Stage 1 with normal or high GFR (GFR > 90 mL/min/1 73 square meters)    Stage 2 Mild CKD (GFR = 60-89 mL/min/1 73 square meters)    Stage 3A Moderate CKD (GFR = 45-59 mL/min/1 73 square meters)    Stage 3B Moderate CKD (GFR = 30-44 mL/min/1 73 square meters)    Stage 4 Severe CKD (GFR = 15-29 mL/min/1 73 square meters)    Stage 5 End Stage CKD (GFR <15 mL/min/1 73 square meters)  Note: GFR calculation is accurate only with a steady state creatinine    Lipase [[id number]]  (Normal) Collected:  08/15/19 1914    Lab Status:  Final result Specimen:  Blood from Arm, Left Updated:  08/15/19 1946     Lipase 155 u/L     CBC and differential [[id number]] Collected:  08/15/19 1914    Lab Status:  Final result Specimen:  Blood from Arm, Left Updated:  08/15/19 1935     WBC 9 60 Thousand/uL      RBC 4 67 Million/uL      Hemoglobin 14 0 g/dL      Hematocrit 43 6 %      MCV 93 fL      MCH 30 0 pg      MCHC 32 1 g/dL      RDW 13 3 %      MPV 9 7 fL      Platelets 635 Thousands/uL      nRBC 0 /100 WBCs      Neutrophils Relative 71 %      Immat GRANS % 0 %      Lymphocytes Relative 18 %      Monocytes Relative 8 %      Eosinophils Relative 2 %      Basophils Relative 1 %      Neutrophils Absolute 6 78 Thousands/µL      Immature Grans Absolute 0 03 Thousand/uL      Lymphocytes Absolute 1 72 Thousands/µL      Monocytes Absolute 0 79 Thousand/µL      Eosinophils Absolute 0 22 Thousand/µL      Basophils Absolute 0 06 Thousands/µL     POCT urinalysis dipstick [[id number]]  (Abnormal) Resulted:  08/15/19 1925    Lab Status:  Final result Specimen:  Urine Updated:  08/15/19 1925    POCT pregnancy, urine [[id number]]  (Normal) Resulted:  08/15/19 1924    Lab Status:  Final result Updated:  08/15/19 1925     EXT PREG TEST UR (Ref: Negative) Negative (-)     Control Valid    ED Urine Macroscopic [[id number]]  (Abnormal) Collected:  08/15/19 1936    Lab Status:  Final result Specimen:  Urine Updated:  08/15/19 1921     Color, UA Yellow     Clarity, UA Clear     pH, UA 6 0     Leukocytes, UA Negative     Nitrite, UA Negative     Protein, UA Negative mg/dl      Glucose, UA Negative mg/dl      Ketones, UA Negative mg/dl      Urobilinogen, UA 0 2 E U /dl      Bilirubin, UA Negative     Blood, UA Trace     Specific Gravity, UA <=1 005    Narrative:       CLINITEK RESULT                 No orders to display              Procedures  Procedures       ED Course  ED Course as of Aug 15 2105   Thu Aug 15, 2019   1942 Leukocytes, UA: Negative   1942 Nitrite, UA: Negative   1942 PREGNANCY TEST URINE: Negative (-)                               MDM  Number of Diagnoses or Management Options  Diagnosis management comments: Likely gastritis  Will check basic lab work, urinalysis, urine pregnancy  Carafate and Pepcid for symptoms  Disposition pending results  Disposition  Final diagnoses:   Abdominal pain   Nausea     Time reflects when diagnosis was documented in both MDM as applicable and the Disposition within this note     Time User Action Codes Description Comment    8/15/2019  8:49 PM Nima Furnish P Add [R10 9] Abdominal pain     8/15/2019  8:49 PM Nima Furnish P Add [R11 0] Nausea       ED Disposition     ED Disposition Condition Date/Time Comment    Discharge Stable Thu Aug 15, 2019  8:49 PM Juliane Clark discharge to home/self care              Follow-up Information     Follow up With Specialties Details Why Contact Info Additional [address] [facility] Emergency Medicine Go to  If symptoms worsen [place] [id number]  [phone] [place] ED, [address]  , [place], [place], [address] [facility] Schedule an appointment as soon as possible for a visit   [address] [facility] [address] [address]  [address] [facility] [id number]  [address] [facility], [address] [facility] [address],  [address], [place], [place], [address]          Discharge Medication List as of 8/15/2019  8:51 PM      START taking these medications    Details   aluminum-magnesium hydroxide-simethicone (MAALOX) 365-227-20 MG/5ML SUSP Take 20 mL by mouth 4 (four) times a day (before meals and at bedtime) for 30 days, Starting Thu 8/15/2019, Until Sat 9/14/2019, Print      famotidine (PEPCID) 20 mg tablet Take 1 tablet (20 mg total) by mouth 2 (two) times a day, Starting Thu 8/15/2019, Print      ondansetron (ZOFRAN-ODT) 4 mg disintegrating tablet Take 1 tablet (4 mg total) by mouth every 6 (six) hours as needed for nausea, Starting Thu 8/15/2019, Print         CONTINUE these medications which have NOT CHANGED    Details   omeprazole (PriLOSEC) 20 mg delayed release capsule Take 1 capsule (20 mg total) by mouth daily, Starting Wed 6/5/2019, Print      acetaminophen (TYLENOL) 500 mg tablet Take 1 tablet (500 mg total) by mouth every 6 (six) hours as needed for mild pain, moderate pain, headaches or fever, Starting Sun 8/12/2018, Print      albuterol (ACCUNEB) 1 25 MG/3ML nebulizer solution Take 3 mL (1 25 mg total) by nebulization every 6 (six) hours as needed for wheezing, Starting Sun 9/9/2018, Until Mon 9/9/2019, Print      !! albuterol (PROVENTIL HFA,VENTOLIN HFA) 90 mcg/act inhaler Inhale 2 puffs every 6 (six) hours as needed for wheezing, Starting Sun 9/9/2018, Until Mon 9/9/2019, Print      !! albuterol (PROVENTIL HFA,VENTOLIN HFA) 90 mcg/act inhaler Inhale 2 puffs every 4 (four) hours as needed for wheezing or shortness of breath, Starting Mon 11/5/2018, Print       ! ! - [id number] Potential duplicate medications found  Please discuss with provider  No discharge procedures on file      ED Provider  Electronically Signed by           Pk Guillory MD  08/15/19 7452

## 2021-11-14 NOTE — ED PROVIDER NOTE - NSFOLLOWUPINSTRUCTIONS_ED_ALL_ED_FT
Pneumonia    WHAT YOU NEED TO KNOW:    Pneumonia is an infection in your lungs caused by bacteria, viruses, fungi, or parasites. You can become infected if you come in contact with someone who is sick. You can get pneumonia if you recently had surgery or needed a ventilator to help you breathe. Pneumonia can also be caused by accidentally inhaling saliva or small pieces of food. Pneumonia may cause mild symptoms, or it can be severe and life-threatening. The Lungs         DISCHARGE INSTRUCTIONS:    Return to the emergency department if:     You cough up blood.       Your heart beats more than 100 beats in 1 minute.       You are very tired, confused, and cannot think clearly.      You have chest pain or trouble breathing.       Your lips or fingernails turn gray or blue.     Contact your healthcare provider if:     Your symptoms are the same or get worse 48 hours after you start antibiotics.      Your fever is not below 99°F (37.2°C) 48 hours after you start antibiotics.       You have a fever higher than 101°F (38.3°C).       You cannot eat, or you have loss of appetite, nausea, or are vomiting.      You have questions or concerns about your condition or care.    Medicines:     Antibiotics treat pneumonia caused by bacteria.      Acetaminophen decreases pain and fever. It is available without a doctor's order. Ask how much to take and how often to take it. Follow directions. Read the labels of all other medicines you are using to see if they also contain acetaminophen, or ask your doctor or pharmacist. Acetaminophen can cause liver damage if not taken correctly. Do not use more than 4 grams (4,000 milligrams) total of acetaminophen in one day.       NSAIDs, such as ibuprofen, help decrease swelling, pain, and fever. This medicine is available with or without a doctor's order. NSAIDs can cause stomach bleeding or kidney problems in certain people. If you take blood thinner medicine, always ask your healthcare provider if NSAIDs are safe for you. Always read the medicine label and follow directions.      Take your medicine as directed. Contact your healthcare provider if you think your medicine is not helping or if you have side effects. Tell him or her if you are allergic to any medicine. Keep a list of the medicines, vitamins, and herbs you take. Include the amounts, and when and why you take them. Bring the list or the pill bottles to follow-up visits. Carry your medicine list with you in case of an emergency.    Follow up with your healthcare provider as directed: You will need to return for more tests. Write down your questions so you remember to ask them during your visits.     Manage your symptoms:     Rest as needed. Rest often throughout the day. Alternate times of activity with times of rest.      Drink liquids as directed. Ask how much liquid to drink each day and which liquids are best for you. Liquids help thin your mucus, which may make it easier for you to cough it up.       Do not smoke. Avoid secondhand smoke. Smoking increases your risk for pneumonia. Smoking also makes it harder for you to get better after you have had pneumonia. Ask your healthcare provider for information if you need help to quit smoking.       Use a cool mist humidifier. A humidifier will help increase air moisture in your home. This may make it easier for you to breathe and help decrease your cough.       Keep your head elevated. You may be able to breathe better if you lie down with the head of your bed up.     Prevent pneumonia:     Prevent the spread of germs. Wash your hands often with soap and water. Use gel hand cleanser when there is no soap and water available. Do not touch your eyes, nose, or mouth unless you have washed your hands first. Cover your mouth when you cough. Cough into a tissue or your shirtsleeve so you do not spread germs from your hands. If you are sick, stay away from others as much as possible. Handwashing           Limit alcohol. Women should limit alcohol to 1 drink a day. Men should limit alcohol to 2 drinks a day. A drink of alcohol is 12 ounces of beer, 5 ounces of wine, or 1½ ounces of liquor.      Ask about vaccines. You may need a vaccine to help prevent pneumonia. Get an influenza (flu) vaccine every year as soon as it becomes available.          © Copyright [employer] 2019 All illustrations and images included in CareNotes are the copyrighted property of [employer]., [employer] or [employer].

## 2021-11-14 NOTE — ED PROVIDER NOTE - PROGRESS NOTE DETAILS
Patient feeling improved ; lung sounds improved. All labs and imaging studies reviewed and patient has been provided a copy. Strict return precautions discussed.

## 2021-11-14 NOTE — ED ADULT NURSE NOTE - NSIMPLEMENTINTERV_GEN_ALL_ED
Implemented All Universal Safety Interventions:  [place] to call system. Call bell, personal items and telephone within reach. Instruct patient to call for assistance. Room bathroom lighting operational. Non-slip footwear when patient is off stretcher. Physically safe environment: no spills, clutter or unnecessary equipment. Stretcher in lowest position, wheels locked, appropriate side rails in place.

## 2021-11-14 NOTE — ED PROVIDER NOTE - ATTENDING CONTRIBUTION TO CARE
56 yo M PMH gastric bypass, dm, asthma pw cough. Cough for the past 5 days, associated with mild wheezing. Using inhaler with improvement. NO cp, no abd pain, no sob, no n/v/d, no f/c, no palpitations, no syncope.     CONSTITUTIONAL: Well-developed; well-nourished; in no acute distress. Sitting up and providing appropriate history and physical examination  SKIN: skin exam is warm and dry, no acute rash.  HEAD: Normocephalic; atraumatic.  EYES: PERRL, 3 mm bilateral, no nystagmus, EOM intact; conjunctiva and sclera clear.  ENT: No nasal discharge; airway clear.  NECK: Supple; non tender. + full passive ROM in all directions. No JVD  CARD: S1, S2 normal; no murmurs, gallops, or rubs. Regular rate and rhythm. + Symmetric Strong Pulses  RESP: No wheezes, rales or rhonchi. Good air movement bilaterally  ABD: soft; non-distended; non-tender. No Rebound, No Guarding, No signs of peritonitis, No CVA tenderness. No pulsatile abdominal mass. + Strong and Symmetric Pulses  EXT: Normal ROM. No clubbing, cyanosis or edema. Dp and Pt Pulses intact. Cap refill less than 3 seconds  NEURO: CN 2-12 intact, normal finger to nose, normal romberg, stable gait, no sensory or motor deficits, Alert, oriented, grossly unremarkable. No Focal deficits. GCS 15. NIH 0  PSYCH: Cooperative, appropriate.

## 2021-11-14 NOTE — ED ADULT TRIAGE NOTE - CHIEF COMPLAINT QUOTE
Pt c/o sore throat, cough since Wednesday. Denies fever, [place] [facility] [place] Pt has ketones in the urine. Pt c/o sore throat, cough since Wednesday. Denies fever, was sent from [facility] [place]. Pt has ketones in the urine. Rapid test COVID "-" 11/14

## 2021-11-14 NOTE — ED PROVIDER NOTE - OBJECTIVE STATEMENT
54 y/o M, PMHx Gastric Bypass (Gregg-En-Y), DM & Asthma, presents to the ED with complaints of a cough and wheezing x five days. He admits to a productive cough and wheezing for which he has been using his inhaler with some symptomatic improvement. He denies accompanying chest pain, dyspnea, fever, chills, recent travel and recent known sick contacts. He is not a smoker. [place] [facility] [place] advised to come to the ED for a CXR.

## 2021-11-14 NOTE — ED ADULT NURSE NOTE - CHIEF COMPLAINT QUOTE
Pt c/o sore throat, cough since Wednesday. Denies fever, was sent from [facility] for [place]R. Pt has ketones in the urine. Rapid test COVID "-" 11/14

## 2021-11-14 NOTE — ED PROVIDER NOTE - PATIENT PORTAL LINK FT
You can access the FollowMyHealth Patient Portal offered by [facility] by registering at the following website: [email or web address]. By joining [employer]’s FollowMyHealth portal, you will also be able to view your health information using other applications (apps) compatible with our system.

## 2021-11-15 ENCOUNTER — TRANSCRIPTION ENCOUNTER (OUTPATIENT)
Age: 56
End: 2021-11-15

## 2021-12-29 NOTE — ED ADULT NURSE NOTE - CAS EDN DISCHARGE ASSESSMENT
Alert and oriented to person, place and time Stelara Dosing: 45mg SC at week 0 and 4 and then every 12 weeks thereafter Detail Level: Zone Diagnosis (Required): Psoriasis Add Pregnancy And Lactation Warning To Medication Counseling?: No Pregnancy And Lactation Warning Text: This medication is Pregnancy Category B and is considered safe during pregnancy. It is unknown if this medication is excreted in breast milk. Stelara Monitoring Guidelines: A yearly test for tuberculosis is required while taking Stelara.

## 2022-01-15 ENCOUNTER — EMERGENCY (EMERGENCY)
Facility: HOSPITAL | Age: 57
LOS: 0 days | Discharge: HOME | End: 2022-01-15
Attending: EMERGENCY MEDICINE | Admitting: EMERGENCY MEDICINE
Payer: MEDICARE

## 2022-01-15 VITALS
OXYGEN SATURATION: 95 % | TEMPERATURE: 95 F | WEIGHT: 300.05 LBS | RESPIRATION RATE: 18 BRPM | HEART RATE: 84 BPM | HEIGHT: 76 IN

## 2022-01-15 DIAGNOSIS — Z98.890 OTHER SPECIFIED POSTPROCEDURAL STATES: Chronic | ICD-10-CM

## 2022-01-15 DIAGNOSIS — U07.1 COVID-19: ICD-10-CM

## 2022-01-15 DIAGNOSIS — R05.9 COUGH, UNSPECIFIED: ICD-10-CM

## 2022-01-15 DIAGNOSIS — Z96.642 PRESENCE OF LEFT ARTIFICIAL HIP JOINT: Chronic | ICD-10-CM

## 2022-01-15 DIAGNOSIS — Z79.01 LONG TERM (CURRENT) USE OF ANTICOAGULANTS: ICD-10-CM

## 2022-01-15 DIAGNOSIS — I10 ESSENTIAL (PRIMARY) HYPERTENSION: ICD-10-CM

## 2022-01-15 DIAGNOSIS — R06.02 SHORTNESS OF BREATH: ICD-10-CM

## 2022-01-15 DIAGNOSIS — J45.909 UNSPECIFIED ASTHMA, UNCOMPLICATED: ICD-10-CM

## 2022-01-15 DIAGNOSIS — E11.9 TYPE 2 DIABETES MELLITUS WITHOUT COMPLICATIONS: ICD-10-CM

## 2022-01-15 PROCEDURE — 99284 EMERGENCY DEPT VISIT MOD MDM: CPT

## 2022-01-15 NOTE — ED PROVIDER NOTE - NSICDXPASTMEDICALHX_GEN_ALL_CORE_FT
PAST MEDICAL HISTORY:  Bipolar disorder     DVT (deep venous thrombosis)     GERD (gastroesophageal reflux disease)     Hypertension

## 2022-01-15 NOTE — ED PROVIDER NOTE - NS ED ROS FT
Constitutional: (-) fever  Eyes/ENT: (-) blurry vision, (-) epistaxis  Cardiovascular: (-) chest pain, (-) syncope  Respiratory: (+) sob, (+) cough  Gastrointestinal: (-) vomiting, (-) diarrhea  : (-) dysuria, (-) hematuria  Musculoskeletal: (-) neck pain, (-) back pain, (-) joint pain  Integumentary: (-) rash, (-) edema  Neurological: (-) headache, (-) altered mental status  Allergic/Immunologic: (-) pruritus

## 2022-01-15 NOTE — ED PROVIDER NOTE - ATTENDING CONTRIBUTION TO CARE
56-year-old male with past medical history of diabetes gastric bypass and asthma presents with complaint of intermittent shortness of breath.  Patient says it sometimes is exertional and sometimes at rest.  Patient has had COVID now for about a week.  Patient is vaccinated x2 without a booster.  Patient denies fever chills nausea vomiting diarrhea chest pain.  Exam: nad, ncat, perrl, eomi, mmm, rrr, ctab, abd soft, nt, nd aox3, no calf tenderness, no pitting edema impression: Patient with asthma, has used his inhaler about 6 times already today no wheezing on exam however with persistent intermittent shortness of breath we will start patient on prednisone for 5 days.  Patient to follow-up with pulmonology outpatient

## 2022-01-15 NOTE — ED PROVIDER NOTE - CARE PROVIDER_API CALL
Simon Fernandes)  Critical Care Medicine; Internal Medicine; Pulmonary Disease; Sleep Medicine  [address] [place] [address]  Phone: [phone]  Fax: [phone]  Follow Up Time:

## 2022-01-15 NOTE — ED PROVIDER NOTE - PATIENT PORTAL LINK FT
You can access the FollowMyHealth Patient Portal offered by [facility] by registering at the following website: [email or web address]. By joining [employer]’s FollowMyHealth portal, you will also be able to view your health information using other applications (apps) compatible with our system.

## 2022-01-15 NOTE — ED PROVIDER NOTE - NSFOLLOWUPINSTRUCTIONS_ED_ALL_ED_FT
Follow up with PMD, COVID Clinic and Pulmonology in 1-2 days.    Novel Coronavirus (COVID-19)  The Facts  What is a coronavirus?  Coronaviruses are a large family of viruses that cause illnesses ranging from the common cold  to more severe diseases such as Middle East Respiratory Syndrome (MERS) and Severe Acute  Respiratory Syndrome (SARS).  What is Novel Coronavirus (COVID-19)?  COVID-19 is a new strain of Coronavirus that has not been previously identified in humans. COVID-19  was identified in [place], [place], [place] in December 2019 (COVID-19). COVID-19 has  since been identified outside of [place], in a growing number of countries internationally, including  the [place].  Where can I find the most recent information about COVID-19?  The Centers for Disease Control and Prevention (CDC) is closely monitoring the outbreak caused by the  COVID-19. For the latest information about COVID- 19, visit the CDC website at  https://www.cdc.gov/coronavirus/index.html  How are coronaviruses spread?  Coronaviruses can be transmitted from person-to- person, usually after close contact with an infected person,  for example, in a household, workplace, or healthcare setting via droplets that become airborne after a cough  or sneeze by an affected person. These droplets can then infect a nearby person. It is likely transmission also  occurs by touching recently contaminated surfaces.  What are the symptoms of coronavirus infection?  It depends on the virus, but common signs include fever and/or respiratory symptoms such as  cough and shortness of breath. In more severe cases, infection can cause pneumonia, severe acute  respiratory syndrome, kidney failure and even death. Fortunately, most cases of COVID-19 have an  illness no different than the influenza “flu”. With a majority of these patients having mild symptoms  and overall mortality which appears to be not much different than the flu.  Is there a treatment for a COVID-19?  There is no specific treatment for disease caused by COVID-19. However, many of the symptoms can  be treated based on the patient’s clinical condition. Supportive care for infected persons can be highly  effective.  What can I do to protect myself?  Washing your hands, covering your cough, and disinfecting surfaces are the best precautionary  measures. It is also advisable to avoid close contact with anyone showing symptoms of respiratory  illness such as coughing and sneezing. Those with symptoms should wear a surgical mask when  around others.  What can I do to protect those around me?  If you have been identified as someone who may be infected with COVID-19, we recommend you  follow the self-isolation procedures outlined below to protect those around you and limit the spread  of this virus.   March 3, 2020  Recommendations for Patients Advised to Self-Isolate  for Possible COVID-19 Exposure  We recommend the below precautionary steps from now until 14 days from when you  returned from your travel or date of your last known possible contact:  - Do not go to work, school, or public areas. Avoid using public transportation, ride-sharing, or  taxis.  - As much as possible, separate yourself from other people in your home. If you can, you should  stay in a room and away from other people in your home. Also, you should use a separate  bathroom, if available.  - Wear the supplied mask whenever you are around other people.  - If you have a non-urgent medical appointment, please reschedule for a later date. If the  appointment is urgent, please call the healthcare provider and tell them that you are on selfisolation for possible COVID-19. This will help the healthcare provider’s office take steps to keep  other people from getting infected or exposed. If you can reschedule routine appointments, do  so.  - Wash your hands often with soap and water for at least 15 to 20 seconds or clean your hands  with an alcohol-based hand  that contains 60 to 95% alcohol, covering all surfaces of  your hands and rubbing them together until they feel dry. Soap and water should be used  preferentially if hands are visibly dirty.  - Cover your mouth and nose with a tissue when you cough or sneeze. Throw used tissues in a  lined trash can; immediately wash your hands.  - Avoid touching your eyes, nose, and mouth with your hands.  - Avoid sharing personal household items. You should not share dishes, drinking glasses, cups,  eating utensils, towels, or bedding with other people or pets in your home. After using these  items, they should be washed thoroughly with soap and water.  - Clean and disinfect all “high-touch” surfaces every day. High touch surfaces include counters,  tabletops, doorknobs, light switches, remote controls, bathroom fixtures, toilets, phones,  keyboards, tablets, and bedside tables. Also, clean any surfaces that may have blood, stool, or  body fluids on them.       If you develop worsening symptoms:  - If you develop worsening symptoms, such as severe shortness of breath, please call [phone] option #[phone]. They will assist you in determining your next steps.  During your time on self-isolation do the following:  - Work from home if you are able to so.  - Limit social isolation by talking with friends and family on the phone or with face-time  - Talk with friends and relatives who don’t live with you about supporting each other if one  household has to be quarantined. For example, agree to drop groceries or other supplies at the  front door.  - Exercise and spend time outdoors away from others if able to do so.    Why didn’t I get tested for novel coronavirus (COVID-19)?  The number of available tests is very limited so strict rules exist for who is allowed to be tested.  [facility] has been authorized to perform testing and is currently working hard to be  able to start providing the test. Such testing is currently reserved for patients who have had  contact with someone infected with the virus, or those who are very sick a plus those who have  traveled to areas identified by the Centers for Disease Control and Prevention (CD) and will  require hospitalization.  What should I do now?  If you are well enough to be discharged home and are not in a high risk group to have  contracted the COVID-10, you should care for yourself at home exactly like you would if you  have Influenza “flu”. Follow all the standard guidelines about washing your hands, covering  your cough, etc.  You should return to the Emergency Department if you develop worse symptoms, trouble  breathing, chest pain, and/or a fever that doesn’t improve with over the counter  acetaminophen or ibuprofen.

## 2022-01-15 NOTE — ED PROVIDER NOTE - OBJECTIVE STATEMENT
56y M pmh gastric bypass, DM, Asthma presents for eval of sob. Pt is COVID (+) x1wk with associated intermittent episodes of sob with np cough that has been improving, aggravated with ambulation, relieved at rest.

## 2022-01-15 NOTE — ED PROVIDER NOTE - CLINICAL SUMMARY MEDICAL DECISION MAKING FREE TEXT BOX
Patient with asthma, has used his inhaler about 6 times already today no wheezing on exam however with persistent intermittent shortness of breath we will start patient on prednisone for 5 days.  Patient to follow-up with pulmonology outpatient

## 2022-01-15 NOTE — ED PROVIDER NOTE - NSFOLLOWUPCLINICS_GEN_ALL_ED_FT
[facility] [place] [address]  Phone: [phone]  Fax:

## 2022-03-02 ENCOUNTER — APPOINTMENT (OUTPATIENT)
Dept: CARDIOLOGY | Facility: CLINIC | Age: 57
End: 2022-03-02
Payer: MEDICARE

## 2022-03-02 PROCEDURE — 93000 ELECTROCARDIOGRAM COMPLETE: CPT

## 2022-03-02 PROCEDURE — 99214 OFFICE O/P EST MOD 30 MIN: CPT

## 2022-03-10 ENCOUNTER — APPOINTMENT (OUTPATIENT)
Dept: VASCULAR SURGERY | Facility: CLINIC | Age: 57
End: 2022-03-10
Payer: MEDICARE

## 2022-03-10 VITALS
SYSTOLIC BLOOD PRESSURE: 146 MMHG | WEIGHT: 300 LBS | BODY MASS INDEX: 36.53 KG/M2 | DIASTOLIC BLOOD PRESSURE: 92 MMHG | HEIGHT: 76 IN

## 2022-03-10 DIAGNOSIS — M79.604 PAIN IN RIGHT LEG: ICD-10-CM

## 2022-03-10 DIAGNOSIS — M79.605 PAIN IN RIGHT LEG: ICD-10-CM

## 2022-03-10 PROCEDURE — 93970 EXTREMITY STUDY: CPT

## 2022-03-10 PROCEDURE — 99214 OFFICE O/P EST MOD 30 MIN: CPT

## 2022-03-21 ENCOUNTER — NON-APPOINTMENT (OUTPATIENT)
Age: 57
End: 2022-03-21

## 2022-03-21 ENCOUNTER — APPOINTMENT (OUTPATIENT)
Age: 57
End: 2022-03-21
Payer: MEDICARE

## 2022-03-21 VITALS
OXYGEN SATURATION: 98 % | WEIGHT: 300 LBS | BODY MASS INDEX: 36.53 KG/M2 | HEIGHT: 76 IN | HEART RATE: 100 BPM | RESPIRATION RATE: 14 BRPM | SYSTOLIC BLOOD PRESSURE: 126 MMHG | DIASTOLIC BLOOD PRESSURE: 76 MMHG

## 2022-03-21 DIAGNOSIS — Z82.3 FAMILY HISTORY OF STROKE: ICD-10-CM

## 2022-03-21 DIAGNOSIS — Z87.09 PERSONAL HISTORY OF OTHER DISEASES OF THE RESPIRATORY SYSTEM: ICD-10-CM

## 2022-03-21 DIAGNOSIS — Z86.69 PERSONAL HISTORY OF OTHER DISEASES OF THE NERVOUS SYSTEM AND SENSE ORGANS: ICD-10-CM

## 2022-03-21 DIAGNOSIS — Z82.49 FAMILY HISTORY OF ISCHEMIC HEART DISEASE AND OTHER DISEASES OF THE CIRCULATORY SYSTEM: ICD-10-CM

## 2022-03-21 DIAGNOSIS — Z86.39 PERSONAL HISTORY OF OTHER ENDOCRINE, NUTRITIONAL AND METABOLIC DISEASE: ICD-10-CM

## 2022-03-21 DIAGNOSIS — Z87.898 PERSONAL HISTORY OF OTHER SPECIFIED CONDITIONS: ICD-10-CM

## 2022-03-21 PROCEDURE — 99204 OFFICE O/P NEW MOD 45 MIN: CPT

## 2022-03-21 NOTE — DISCUSSION/SUMMARY
[FreeTextEntry1] : SOB ON EXERTION/ SP SEVERE COVID / PE\par RECURRENT ASPIRATION\par GASTRIC BYPASS/ GERD\par MILD INTERMITTENT ASTHMA\par WEIGHT LOSS\par PRIOR HX OF DVT\par LIFELONG ELIQUIS\par NEXT VISIT CT ANGIO

## 2022-03-21 NOTE — REASON FOR VISIT
[Initial] : an initial visit [Pulmonary Embolism] : pulmonary embolism [Shortness of Breath] : shortness of breath

## 2022-04-15 ENCOUNTER — OUTPATIENT (OUTPATIENT)
Dept: OUTPATIENT SERVICES | Facility: HOSPITAL | Age: 57
LOS: 1 days | Discharge: HOME | End: 2022-04-15
Payer: MEDICARE

## 2022-04-15 ENCOUNTER — RESULT REVIEW (OUTPATIENT)
Age: 57
End: 2022-04-15

## 2022-04-15 DIAGNOSIS — R07.9 CHEST PAIN, UNSPECIFIED: ICD-10-CM

## 2022-04-15 DIAGNOSIS — Z96.642 PRESENCE OF LEFT ARTIFICIAL HIP JOINT: Chronic | ICD-10-CM

## 2022-04-15 DIAGNOSIS — Z98.890 OTHER SPECIFIED POSTPROCEDURAL STATES: Chronic | ICD-10-CM

## 2022-04-15 PROCEDURE — 78452 HT MUSCLE IMAGE SPECT MULT: CPT | Mod: 26

## 2022-04-26 ENCOUNTER — APPOINTMENT (OUTPATIENT)
Dept: CARDIOLOGY | Facility: CLINIC | Age: 57
End: 2022-04-26
Payer: MEDICARE

## 2022-04-26 PROCEDURE — 99214 OFFICE O/P EST MOD 30 MIN: CPT

## 2022-04-26 PROCEDURE — 93000 ELECTROCARDIOGRAM COMPLETE: CPT

## 2022-07-13 ENCOUNTER — APPOINTMENT (OUTPATIENT)
Age: 57
End: 2022-07-13

## 2022-07-13 VITALS
HEART RATE: 80 BPM | DIASTOLIC BLOOD PRESSURE: 78 MMHG | WEIGHT: 295 LBS | SYSTOLIC BLOOD PRESSURE: 130 MMHG | BODY MASS INDEX: 35.92 KG/M2 | HEIGHT: 76 IN | OXYGEN SATURATION: 96 % | RESPIRATION RATE: 14 BRPM

## 2022-07-13 PROCEDURE — 99214 OFFICE O/P EST MOD 30 MIN: CPT

## 2022-07-13 RX ORDER — ALBUTEROL SULFATE 90 UG/1
108 (90 BASE) INHALANT RESPIRATORY (INHALATION) EVERY 4 HOURS
Qty: 3 | Refills: 3 | Status: ACTIVE | COMMUNITY
Start: 2022-07-13 | End: 1900-01-01

## 2022-07-13 NOTE — DISCUSSION/SUMMARY
[FreeTextEntry1] : SOB ON EXERTION/ SP SEVERE COVID / PE BETTER\par RECURRENT ASPIRATION\par GASTRIC BYPASS/ GERD\par MILD INTERMITTENT ASTHMA\par WEIGHT LOSS\par LIFELONG ELIQUIS\par CT ANGIO

## 2022-09-08 ENCOUNTER — APPOINTMENT (OUTPATIENT)
Dept: VASCULAR SURGERY | Facility: CLINIC | Age: 57
End: 2022-09-08

## 2022-09-29 ENCOUNTER — APPOINTMENT (OUTPATIENT)
Dept: CARDIOLOGY | Facility: CLINIC | Age: 57
End: 2022-09-29

## 2022-09-29 PROCEDURE — 99214 OFFICE O/P EST MOD 30 MIN: CPT | Mod: 25

## 2022-09-29 PROCEDURE — 93000 ELECTROCARDIOGRAM COMPLETE: CPT

## 2022-09-30 ENCOUNTER — RESULT REVIEW (OUTPATIENT)
Age: 57
End: 2022-09-30

## 2022-09-30 ENCOUNTER — OUTPATIENT (OUTPATIENT)
Dept: OUTPATIENT SERVICES | Facility: HOSPITAL | Age: 57
LOS: 1 days | Discharge: HOME | End: 2022-09-30

## 2022-09-30 DIAGNOSIS — Z96.642 PRESENCE OF LEFT ARTIFICIAL HIP JOINT: Chronic | ICD-10-CM

## 2022-09-30 DIAGNOSIS — I26.90 SEPTIC PULMONARY EMBOLISM WITHOUT ACUTE COR PULMONALE: ICD-10-CM

## 2022-09-30 DIAGNOSIS — Z98.890 OTHER SPECIFIED POSTPROCEDURAL STATES: Chronic | ICD-10-CM

## 2022-09-30 PROCEDURE — 71275 CT ANGIOGRAPHY CHEST: CPT | Mod: 26

## 2022-10-19 ENCOUNTER — APPOINTMENT (OUTPATIENT)
Age: 57
End: 2022-10-19

## 2022-10-19 VITALS
OXYGEN SATURATION: 96 % | DIASTOLIC BLOOD PRESSURE: 80 MMHG | BODY MASS INDEX: 35.31 KG/M2 | RESPIRATION RATE: 14 BRPM | HEART RATE: 91 BPM | SYSTOLIC BLOOD PRESSURE: 120 MMHG | WEIGHT: 290 LBS | HEIGHT: 76 IN

## 2022-10-19 DIAGNOSIS — E66.9 OBESITY, UNSPECIFIED: ICD-10-CM

## 2022-10-19 PROCEDURE — 99213 OFFICE O/P EST LOW 20 MIN: CPT

## 2022-10-19 NOTE — DISCUSSION/SUMMARY
[FreeTextEntry1] : SOB ON EXERTION/ SP SEVERE COVID / PE BETTER\par RECURRENT ASPIRATION\par GASTRIC BYPASS/ GERD\par MILD INTERMITTENT ASTHMA\par WEIGHT LOSS\par LIFELONG ELIQUIS ( RECURRENT DVT)\par CT ANGIO REVIEWED\par DENIES ARA

## 2022-10-19 NOTE — HISTORY OF PRESENT ILLNESS
[TextBox_4] : SOB OB EXERTION BETTER \par ON ELIQUIS SP REPEAT CHEST C\par AS NEEDED RESCUE INHALERS\par FOLLOWED BY CARDIO

## 2022-12-27 ENCOUNTER — OUTPATIENT (OUTPATIENT)
Dept: OUTPATIENT SERVICES | Facility: HOSPITAL | Age: 57
LOS: 1 days | Discharge: HOME | End: 2022-12-27
Payer: MEDICARE

## 2022-12-27 ENCOUNTER — TRANSCRIPTION ENCOUNTER (OUTPATIENT)
Age: 57
End: 2022-12-27

## 2022-12-27 VITALS
WEIGHT: 279.99 LBS | DIASTOLIC BLOOD PRESSURE: 76 MMHG | TEMPERATURE: 97 F | HEIGHT: 76 IN | SYSTOLIC BLOOD PRESSURE: 141 MMHG | RESPIRATION RATE: 18 BRPM | HEART RATE: 67 BPM

## 2022-12-27 VITALS
HEART RATE: 70 BPM | OXYGEN SATURATION: 96 % | RESPIRATION RATE: 20 BRPM | SYSTOLIC BLOOD PRESSURE: 139 MMHG | DIASTOLIC BLOOD PRESSURE: 70 MMHG

## 2022-12-27 DIAGNOSIS — Z96.642 PRESENCE OF LEFT ARTIFICIAL HIP JOINT: Chronic | ICD-10-CM

## 2022-12-27 DIAGNOSIS — Z98.890 OTHER SPECIFIED POSTPROCEDURAL STATES: Chronic | ICD-10-CM

## 2022-12-27 PROCEDURE — 88305 TISSUE EXAM BY PATHOLOGIST: CPT | Mod: 26

## 2022-12-27 PROCEDURE — 88312 SPECIAL STAINS GROUP 1: CPT | Mod: 26

## 2022-12-27 RX ORDER — FUROSEMIDE 40 MG
1 TABLET ORAL
Qty: 0 | Refills: 0 | DISCHARGE

## 2022-12-27 RX ORDER — METFORMIN HYDROCHLORIDE 850 MG/1
1 TABLET ORAL
Qty: 0 | Refills: 0 | DISCHARGE

## 2022-12-27 RX ORDER — QUETIAPINE FUMARATE 200 MG/1
1 TABLET, FILM COATED ORAL
Qty: 0 | Refills: 0 | DISCHARGE

## 2022-12-27 RX ORDER — TRAMADOL HYDROCHLORIDE 50 MG/1
1 TABLET ORAL
Qty: 0 | Refills: 0 | DISCHARGE

## 2022-12-27 RX ORDER — ASPIRIN/CALCIUM CARB/MAGNESIUM 324 MG
1 TABLET ORAL
Qty: 0 | Refills: 0 | DISCHARGE

## 2022-12-27 RX ORDER — APIXABAN 2.5 MG/1
1 TABLET, FILM COATED ORAL
Qty: 0 | Refills: 0 | DISCHARGE

## 2022-12-27 NOTE — CHART NOTE - NSCHARTNOTEFT_GEN_A_CORE
PACU ANESTHESIA ADMISSION NOTE      Procedure: EGD with biopsy/colonoscopy  Post op diagnosis:  hiatal hernia    __x__  Patent Airway    _x___  Full return of protective reflexes    ____  Full recovery from anesthesia / back to baseline     Vitals:   see anesthesia record      Mental Status:  ___x_ Awake   _____ Alert   _____ Drowsy   _____ Sedated    Nausea/Vomiting:  _x___ NO  ______Yes,   See Post - Op Orders          Pain Scale (0-10):  _____    Treatment: ____ None    ___x_ See Post - Op/PCA Orders    Post - Operative Fluids:   ____ Oral   __x__ See Post - Op Orders    Plan: Discharge:   _x___Home       _____Floor     _____Critical Care    _____  Other:_________________    Comments:  Uneventful intraoperative course. No anesthesia issues or complications noted. Patient stable upon arrival to PACU. Report given to RN. Discharge when criteria met.

## 2022-12-27 NOTE — ASU DISCHARGE PLAN (ADULT/PEDIATRIC) - CARE PROVIDER_API CALL
Christian Dean  GASTROENTEROLOGY  [address] [place] [id number]  Phone: [phone]  Fax: [phone]  Follow Up Time:

## 2022-12-27 NOTE — ASU DISCHARGE PLAN (ADULT/PEDIATRIC) - PROVIDER TOKENS
PROVIDER:[TOKEN:[[id number]]] no vomiting/no back pain/no shortness of breath/no diaphoresis/no syncope/no fever/no cough/no dizziness

## 2022-12-27 NOTE — ASU PATIENT PROFILE, ADULT - NSICDXPASTMEDICALHX_GEN_ALL_CORE_FT
PAST MEDICAL HISTORY:  Bipolar disorder     COVID     DVT (deep venous thrombosis)     GERD (gastroesophageal reflux disease)     Hypertension     Pulmonary embolism      PAST MEDICAL HISTORY:  Bipolar disorder     COVID     Diabetes     DVT (deep venous thrombosis)     GERD (gastroesophageal reflux disease)     Hypertension     Pulmonary embolism

## 2022-12-27 NOTE — ASU DISCHARGE PLAN (ADULT/PEDIATRIC) - NS MD DC FALL RISK RISK
For information on Fall & Injury Prevention, visit: [email or web address] OR  [email or web address] OR  https://www.cdc.gov/steadi/patient.html

## 2022-12-29 LAB — SURGICAL PATHOLOGY STUDY: SIGNIFICANT CHANGE UP

## 2022-12-30 DIAGNOSIS — Z86.711 PERSONAL HISTORY OF PULMONARY EMBOLISM: ICD-10-CM

## 2022-12-30 DIAGNOSIS — K57.30 DIVERTICULOSIS OF LARGE INTESTINE WITHOUT PERFORATION OR ABSCESS WITHOUT BLEEDING: ICD-10-CM

## 2022-12-30 DIAGNOSIS — I10 ESSENTIAL (PRIMARY) HYPERTENSION: ICD-10-CM

## 2022-12-30 DIAGNOSIS — Z98.84 BARIATRIC SURGERY STATUS: ICD-10-CM

## 2022-12-30 DIAGNOSIS — Z79.01 LONG TERM (CURRENT) USE OF ANTICOAGULANTS: ICD-10-CM

## 2022-12-30 DIAGNOSIS — R19.5 OTHER FECAL ABNORMALITIES: ICD-10-CM

## 2022-12-30 DIAGNOSIS — E11.9 TYPE 2 DIABETES MELLITUS WITHOUT COMPLICATIONS: ICD-10-CM

## 2022-12-30 DIAGNOSIS — Z20.822 CONTACT WITH AND (SUSPECTED) EXPOSURE TO COVID-19: ICD-10-CM

## 2022-12-30 DIAGNOSIS — K29.50 UNSPECIFIED CHRONIC GASTRITIS WITHOUT BLEEDING: ICD-10-CM

## 2022-12-30 DIAGNOSIS — F31.9 BIPOLAR DISORDER, UNSPECIFIED: ICD-10-CM

## 2022-12-30 DIAGNOSIS — Z86.718 PERSONAL HISTORY OF OTHER VENOUS THROMBOSIS AND EMBOLISM: ICD-10-CM

## 2022-12-30 DIAGNOSIS — K64.8 OTHER HEMORRHOIDS: ICD-10-CM

## 2022-12-30 DIAGNOSIS — Z79.84 LONG TERM (CURRENT) USE OF ORAL HYPOGLYCEMIC DRUGS: ICD-10-CM

## 2023-01-03 PROBLEM — I26.99 OTHER PULMONARY EMBOLISM WITHOUT ACUTE COR PULMONALE: Chronic | Status: ACTIVE | Noted: 2022-12-27

## 2023-01-03 PROBLEM — U07.1 COVID-19: Chronic | Status: ACTIVE | Noted: 2022-12-27

## 2023-01-03 PROBLEM — E11.9 TYPE 2 DIABETES MELLITUS WITHOUT COMPLICATIONS: Chronic | Status: ACTIVE | Noted: 2022-12-27

## 2023-01-05 ENCOUNTER — APPOINTMENT (OUTPATIENT)
Dept: CARDIOLOGY | Facility: CLINIC | Age: 58
End: 2023-01-05
Payer: MEDICARE

## 2023-01-05 DIAGNOSIS — J12.82 COVID-19: ICD-10-CM

## 2023-01-05 DIAGNOSIS — U07.1 COVID-19: ICD-10-CM

## 2023-01-05 PROCEDURE — 93000 ELECTROCARDIOGRAM COMPLETE: CPT | Mod: 59

## 2023-01-05 PROCEDURE — 93242 EXT ECG>48HR<7D RECORDING: CPT

## 2023-01-05 PROCEDURE — 99214 OFFICE O/P EST MOD 30 MIN: CPT | Mod: 25

## 2023-01-09 PROBLEM — U07.1 PNEUMONIA DUE TO COVID-19 VIRUS: Status: ACTIVE | Noted: 2022-03-21

## 2023-01-10 ENCOUNTER — APPOINTMENT (OUTPATIENT)
Dept: VASCULAR SURGERY | Facility: CLINIC | Age: 58
End: 2023-01-10
Payer: MEDICARE

## 2023-01-10 DIAGNOSIS — I80.01 PHLEBITIS AND THROMBOPHLEBITIS OF SUPERFICIAL VESSELS OF RIGHT LOWER EXTREMITY: ICD-10-CM

## 2023-01-10 PROCEDURE — 93971 EXTREMITY STUDY: CPT

## 2023-01-10 PROCEDURE — 99213 OFFICE O/P EST LOW 20 MIN: CPT

## 2023-01-10 NOTE — ASSESSMENT
[FreeTextEntry1] : 57 y/o gentleman with h/o venous insufficiency, right GSV superficial phlebitis extending into the saphenofemoral junction, was treated with 3 months of Xarelto in 2019. He was hospitalized in February 2022 for COVID infection complicated by PE  and is now on Eliquis.\par \par Venous duplex showed no evidence of DVT or phlebitis in the right lower extremities \par \par I have informed him of the test results, reassured him, advised him to continue Eliquis, followup in six months. He has followup with Pulmonary for PE.\par \par I spent 40 minutes with patient performing physical exam, reviewing duplex results, discussing treatment plan and followup.\par

## 2023-01-10 NOTE — HISTORY OF PRESENT ILLNESS
[FreeTextEntry1] : 57 y/o gentleman with h/o right GSV superficial phlebitis extending into the saphenofemoral junction, was treated with 3 months of Xarelto, and Xarelto discontinued in July 2019. He was hospitalized in February 2022 for COVID infection and pulmonary embolism and is now on Eliquis.\par \par He presents for follow up.

## 2023-01-10 NOTE — HISTORY OF PRESENT ILLNESS
[FreeTextEntry1] : 55 y/o gentleman with h/o right GSV superficial phlebitis extending into the saphenofemoral junction, was treated with 3 months of Xarelto, and Xarelto discontinued in July 2019. He was hospitalized in February 2022 for COVID infection and pulmonary embolism and is now on Eliquis.\par \par He presents for follow up.

## 2023-01-10 NOTE — ASSESSMENT
[FreeTextEntry1] : 55 y/o gentleman with h/o venous insufficiency, right GSV superficial phlebitis extending into the saphenofemoral junction, was treated with 3 months of Xarelto in 2019. He was hospitalized in February 2022 for COVID infection complicated by PE  and is now on Eliquis.\par \par Venous duplex showed no evidence of DVT or phlebitis in the right lower extremities \par \par I have informed him of the test results, reassured him, advised him to continue Eliquis, followup in six months. He has followup with Pulmonary for PE.\par \par I spent 40 minutes with patient performing physical exam, reviewing duplex results, discussing treatment plan and followup.\par

## 2023-01-10 NOTE — DATA REVIEWED
[FreeTextEntry1] : I performed a venous duplex which was medically necessary to evaluate for DVT. It showed no evidence of DVT  in the lower extremities bilaterally.There is a superficial phlebitis noted in the right small and great saphenous veins, no DVT or phlebitis in left lower extremity.

## 2023-02-16 ENCOUNTER — APPOINTMENT (OUTPATIENT)
Dept: CARDIOLOGY | Facility: CLINIC | Age: 58
End: 2023-02-16
Payer: MEDICARE

## 2023-02-16 PROCEDURE — 93325 DOPPLER ECHO COLOR FLOW MAPG: CPT

## 2023-02-16 PROCEDURE — 93351 STRESS TTE COMPLETE: CPT

## 2023-02-16 PROCEDURE — 93320 DOPPLER ECHO COMPLETE: CPT

## 2023-03-13 ENCOUNTER — RX RENEWAL (OUTPATIENT)
Age: 58
End: 2023-03-13

## 2023-04-11 ENCOUNTER — APPOINTMENT (OUTPATIENT)
Dept: PULMONOLOGY | Facility: CLINIC | Age: 58
End: 2023-04-11
Payer: MEDICARE

## 2023-04-11 VITALS
RESPIRATION RATE: 14 BRPM | WEIGHT: 268 LBS | HEART RATE: 95 BPM | BODY MASS INDEX: 32.63 KG/M2 | HEIGHT: 76 IN | OXYGEN SATURATION: 95 % | DIASTOLIC BLOOD PRESSURE: 76 MMHG | SYSTOLIC BLOOD PRESSURE: 118 MMHG

## 2023-04-11 PROCEDURE — 99213 OFFICE O/P EST LOW 20 MIN: CPT

## 2023-04-11 NOTE — HISTORY OF PRESENT ILLNESS
[TextBox_4] : SOB OB EXERTION BETTER \par ON ELIQUIS SP REPEAT CHEST CT NOTED\par REPORTS TYPICAL ARA SYMPTOMS, TIRED,EDS, FATIGUE\par AS NEEDED RESCUE INHALERS\par FOLLOWED BY CARDIO

## 2023-04-11 NOTE — REASON FOR VISIT
[Follow-Up] : a follow-up visit [Sleep Apnea] : sleep apnea [Pulmonary Embolism] : pulmonary embolism [Shortness of Breath] : shortness of breath

## 2023-04-11 NOTE — DISCUSSION/SUMMARY
[FreeTextEntry1] : SOB ON EXERTION/ SP SEVERE COVID / PE BETTER\par RECURRENT ASPIRATION\par GASTRIC BYPASS/ GERD\par MILD INTERMITTENT ASTHMA\par WEIGHT LOSS\par LIFELONG ELIQUIS ( RECURRENT DVT)\par CT ANGIO REVIEWED\par ARA REPORTS TYPICAL SYMPTOMS/ HST

## 2023-05-15 ENCOUNTER — OUTPATIENT (OUTPATIENT)
Dept: OUTPATIENT SERVICES | Facility: HOSPITAL | Age: 58
LOS: 1 days | Discharge: HOME | End: 2023-05-15
Payer: MEDICARE

## 2023-05-15 ENCOUNTER — APPOINTMENT (OUTPATIENT)
Dept: SLEEP CENTER | Facility: HOSPITAL | Age: 58
End: 2023-05-15
Payer: MEDICARE

## 2023-05-15 DIAGNOSIS — Z96.642 PRESENCE OF LEFT ARTIFICIAL HIP JOINT: Chronic | ICD-10-CM

## 2023-05-15 DIAGNOSIS — Z98.890 OTHER SPECIFIED POSTPROCEDURAL STATES: Chronic | ICD-10-CM

## 2023-05-15 DIAGNOSIS — G47.33 OBSTRUCTIVE SLEEP APNEA (ADULT) (PEDIATRIC): ICD-10-CM

## 2023-05-15 PROCEDURE — 95800 SLP STDY UNATTENDED: CPT | Mod: 26

## 2023-05-15 PROCEDURE — 95800 SLP STDY UNATTENDED: CPT

## 2023-05-17 DIAGNOSIS — G47.33 OBSTRUCTIVE SLEEP APNEA (ADULT) (PEDIATRIC): ICD-10-CM

## 2023-06-08 ENCOUNTER — APPOINTMENT (OUTPATIENT)
Dept: PULMONOLOGY | Facility: CLINIC | Age: 58
End: 2023-06-08
Payer: MEDICARE

## 2023-06-08 PROCEDURE — 99441: CPT

## 2023-06-08 NOTE — HISTORY OF PRESENT ILLNESS
[TextBox_4] : SOB OB EXERTION BETTER \par ON ELIQUIS SP REPEAT CHEST CT NOTED\par REPORTS TYPICAL ARA SYMPTOMS, TIRED,EDS, FATIGUE SP HST\par AS NEEDED RESCUE INHALERS\par FOLLOWED BY CARDIO

## 2023-06-08 NOTE — DISCUSSION/SUMMARY
[FreeTextEntry1] : SOB ON EXERTION/ SP SEVERE COVID / PE BETTER\par RECURRENT ASPIRATION\par GASTRIC BYPASS/ GERD\par MILD INTERMITTENT ASTHMA\par MILD ARA / EDS/ TIRED APAP

## 2023-07-02 NOTE — ASSESSMENT
[FreeTextEntry1] : 56 y/o gentleman with h/o right GSV superficial phlebitis extending into the saphenofemoral junction, was treated with 3 months of Xarelto, and Xarelto discontinued in July 2019. He presents for leg swelling, concerned of DVT.\par Leg swelling improves with elevation.\par \par I performed a venous duplex which was medically necessary to evaluate for resolution of thrombosis. It showed no evidence of DVT or phlebitis in the lower extremities bilaterally.\par \par I have informed him of the test results, reassured him, advised him to continue with use of compression stockings and see me back in 6 months for repeat venous duplex.
clear

## 2023-07-11 ENCOUNTER — APPOINTMENT (OUTPATIENT)
Dept: VASCULAR SURGERY | Facility: CLINIC | Age: 58
End: 2023-07-11
Payer: MEDICARE

## 2023-07-11 ENCOUNTER — APPOINTMENT (OUTPATIENT)
Dept: CARDIOLOGY | Facility: CLINIC | Age: 58
End: 2023-07-11
Payer: MEDICARE

## 2023-07-11 VITALS
BODY MASS INDEX: 31.42 KG/M2 | HEIGHT: 76 IN | WEIGHT: 258 LBS | DIASTOLIC BLOOD PRESSURE: 71 MMHG | SYSTOLIC BLOOD PRESSURE: 119 MMHG

## 2023-07-11 DIAGNOSIS — I80.9 PHLEBITIS AND THROMBOPHLEBITIS OF UNSPECIFIED SITE: ICD-10-CM

## 2023-07-11 DIAGNOSIS — I34.9 NONRHEUMATIC MITRAL VALVE DISORDER, UNSPECIFIED: ICD-10-CM

## 2023-07-11 DIAGNOSIS — R94.31 ABNORMAL ELECTROCARDIOGRAM [ECG] [EKG]: ICD-10-CM

## 2023-07-11 DIAGNOSIS — M79.89 OTHER SPECIFIED SOFT TISSUE DISORDERS: ICD-10-CM

## 2023-07-11 DIAGNOSIS — I49.3 VENTRICULAR PREMATURE DEPOLARIZATION: ICD-10-CM

## 2023-07-11 PROCEDURE — 99212 OFFICE O/P EST SF 10 MIN: CPT

## 2023-07-11 PROCEDURE — 93000 ELECTROCARDIOGRAM COMPLETE: CPT

## 2023-07-11 PROCEDURE — 93971 EXTREMITY STUDY: CPT | Mod: RT

## 2023-07-11 PROCEDURE — 99214 OFFICE O/P EST MOD 30 MIN: CPT | Mod: 25

## 2023-07-11 NOTE — DATA REVIEWED
[FreeTextEntry1] : I performed a venous duplex which was medically necessary to evaluate for DVT. It showed no evidence of DVT in the right lower extremity.There is post phlebitic changes noted in the right small saphenous vein.

## 2023-07-11 NOTE — ASSESSMENT
[FreeTextEntry1] : 56 y/o gentleman with h/o venous insufficiency, right GSV superficial phlebitis extending into the saphenofemoral junction, was treated with 3 months of Xarelto in 2019. He was hospitalized in February 2022 for COVID infection complicated by PE and is now on Eliquis.\par \par Venous duplex showed no evidence of DVT in the right lower extremity. There is post phlebitic changes noted in the right small saphenous vein.\par \par I have informed him of the test results. I reassured him and advised him to continue Eliquis, followup in six months.

## 2023-07-11 NOTE — HISTORY OF PRESENT ILLNESS
[FreeTextEntry1] : 58 y/o gentleman with h/o right GSV superficial phlebitis extending into the saphenofemoral junction, was treated with 3 months of Xarelto, and Xarelto discontinued in July 2019. He was hospitalized in February 2022 for COVID infection and pulmonary embolism and is now on Eliquis.\par \par He presents for follow up.

## 2023-07-12 PROBLEM — I34.9 NONRHEUMATIC MITRAL VALVE DISORDER: Status: ACTIVE | Noted: 2020-08-11

## 2023-07-12 PROBLEM — R94.31 ABNORMAL EKG: Status: ACTIVE | Noted: 2021-04-09

## 2023-07-12 PROBLEM — I49.3 VENTRICULAR PREMATURE DEPOLARIZATION: Status: ACTIVE | Noted: 2020-08-11

## 2023-08-21 ENCOUNTER — RX RENEWAL (OUTPATIENT)
Age: 58
End: 2023-08-21

## 2023-10-03 ENCOUNTER — APPOINTMENT (OUTPATIENT)
Dept: NEUROLOGY | Facility: CLINIC | Age: 58
End: 2023-10-03
Payer: MEDICARE

## 2023-10-03 VITALS
HEART RATE: 58 BPM | WEIGHT: 258 LBS | BODY MASS INDEX: 31.42 KG/M2 | SYSTOLIC BLOOD PRESSURE: 129 MMHG | HEIGHT: 76 IN | DIASTOLIC BLOOD PRESSURE: 88 MMHG

## 2023-10-03 PROCEDURE — 99214 OFFICE O/P EST MOD 30 MIN: CPT

## 2023-10-11 ENCOUNTER — APPOINTMENT (OUTPATIENT)
Dept: PULMONOLOGY | Facility: CLINIC | Age: 58
End: 2023-10-11
Payer: MEDICARE

## 2023-10-11 VITALS
RESPIRATION RATE: 14 BRPM | HEIGHT: 76 IN | WEIGHT: 258 LBS | HEART RATE: 96 BPM | SYSTOLIC BLOOD PRESSURE: 110 MMHG | DIASTOLIC BLOOD PRESSURE: 66 MMHG | OXYGEN SATURATION: 96 % | BODY MASS INDEX: 31.42 KG/M2

## 2023-10-11 DIAGNOSIS — I26.99 OTHER PULMONARY EMBOLISM W/OUT ACUTE COR PULMONALE: ICD-10-CM

## 2023-10-11 DIAGNOSIS — R06.02 SHORTNESS OF BREATH: ICD-10-CM

## 2023-10-11 PROCEDURE — 99214 OFFICE O/P EST MOD 30 MIN: CPT

## 2023-10-11 RX ORDER — APIXABAN 5 MG/1
5 TABLET, FILM COATED ORAL
Qty: 180 | Refills: 3 | Status: ACTIVE | COMMUNITY
Start: 2023-10-11 | End: 1900-01-01

## 2023-11-07 ENCOUNTER — APPOINTMENT (OUTPATIENT)
Dept: CARDIOLOGY | Facility: CLINIC | Age: 58
End: 2023-11-07
Payer: MEDICARE

## 2023-11-07 PROCEDURE — 99214 OFFICE O/P EST MOD 30 MIN: CPT

## 2024-01-09 ENCOUNTER — APPOINTMENT (OUTPATIENT)
Dept: VASCULAR SURGERY | Facility: CLINIC | Age: 59
End: 2024-01-09

## 2024-01-11 ENCOUNTER — APPOINTMENT (OUTPATIENT)
Dept: VASCULAR SURGERY | Facility: CLINIC | Age: 59
End: 2024-01-11
Payer: MEDICARE

## 2024-01-11 VITALS
HEIGHT: 76 IN | BODY MASS INDEX: 30.44 KG/M2 | DIASTOLIC BLOOD PRESSURE: 81 MMHG | SYSTOLIC BLOOD PRESSURE: 115 MMHG | HEART RATE: 91 BPM | WEIGHT: 250 LBS

## 2024-01-11 PROCEDURE — 93970 EXTREMITY STUDY: CPT

## 2024-01-11 PROCEDURE — 99212 OFFICE O/P EST SF 10 MIN: CPT

## 2024-01-11 NOTE — HISTORY OF PRESENT ILLNESS
[FreeTextEntry1] : 56 y/o gentleman with h/o right GSV superficial phlebitis extending into the saphenofemoral junction, was treated with 3 months of Xarelto, and Xarelto discontinued in July 2019. He was hospitalized in February 2022 for COVID infection and pulmonary embolism and is now on Eliquis.\par  \par  He presents for follow up.

## 2024-01-11 NOTE — ASSESSMENT
[FreeTextEntry1] : This is a 58 year old with history of DVT. His duplex demonstrated no acute DVT. He will remain of Xarelto. I will see him back in the office in 6 months.

## 2024-01-23 ENCOUNTER — APPOINTMENT (OUTPATIENT)
Dept: NEUROLOGY | Facility: CLINIC | Age: 59
End: 2024-01-23
Payer: MEDICARE

## 2024-01-23 VITALS
HEIGHT: 76 IN | DIASTOLIC BLOOD PRESSURE: 75 MMHG | BODY MASS INDEX: 30.44 KG/M2 | HEART RATE: 63 BPM | SYSTOLIC BLOOD PRESSURE: 138 MMHG | WEIGHT: 250 LBS

## 2024-01-23 PROCEDURE — 99213 OFFICE O/P EST LOW 20 MIN: CPT

## 2024-01-23 NOTE — REVIEW OF SYSTEMS
[Numbness] : numbness [Tingling] : tingling [Abnormal Sensation] : an abnormal sensation [Hypersensitivity] : hypersensitivity [Negative] : Cardiovascular

## 2024-01-23 NOTE — ASSESSMENT
[FreeTextEntry1] : 58 year old male with polyneuropathy.  I will restart increase his Lyrica to 200 mg bid to address his parasthesias.  I will also order an EMG of the upper extremities for further evaluation and he will f/u in 3months for reevaluation.  If there is any issue he will contact the office.    Marci Olivier MS, BARBIE Mohr MD, Supervising Physician

## 2024-01-23 NOTE — PHYSICAL EXAM
[Oriented To Time, Place, And Person] : oriented to person, place, and time [Impaired Insight] : insight and judgment were intact [Affect] : the affect was normal [Mood] : the mood was normal [Memory Recent] : recent memory was not impaired [Memory Remote] : remote memory was not impaired [Person] : oriented to person [Place] : oriented to place [Time] : oriented to time [Short Term Intact] : short term memory intact [Remote Intact] : remote memory intact [Registration Intact] : recent registration memory intact [Cranial Nerves Optic (II)] : visual acuity intact bilaterally,  pupils equal round and reactive to light [Cranial Nerves Oculomotor (III)] : extraocular motion intact [Cranial Nerves Facial (VII)] : face symmetrical [Cranial Nerves Accessory (XI - Cranial And Spinal)] : head turning and shoulder shrug symmetric [Involuntary Movements] : no involuntary movements were seen

## 2024-02-14 ENCOUNTER — APPOINTMENT (OUTPATIENT)
Dept: NEUROLOGY | Facility: CLINIC | Age: 59
End: 2024-02-14
Payer: MEDICARE

## 2024-02-14 PROCEDURE — 95886 MUSC TEST DONE W/N TEST COMP: CPT

## 2024-02-14 PROCEDURE — 95912 NRV CNDJ TEST 11-12 STUDIES: CPT

## 2024-02-22 ENCOUNTER — APPOINTMENT (OUTPATIENT)
Dept: CARDIOLOGY | Facility: CLINIC | Age: 59
End: 2024-02-22
Payer: MEDICARE

## 2024-02-22 VITALS — DIASTOLIC BLOOD PRESSURE: 80 MMHG | HEART RATE: 63 BPM | SYSTOLIC BLOOD PRESSURE: 110 MMHG | HEIGHT: 76 IN

## 2024-02-22 DIAGNOSIS — E78.5 HYPERLIPIDEMIA, UNSPECIFIED: ICD-10-CM

## 2024-02-22 DIAGNOSIS — I71.20 THORACIC AORTIC ANEURYSM, WITHOUT RUPTURE, UNSPECIFIED: ICD-10-CM

## 2024-02-22 DIAGNOSIS — Z86.79 PERSONAL HISTORY OF OTHER DISEASES OF THE CIRCULATORY SYSTEM: ICD-10-CM

## 2024-02-22 PROCEDURE — 93000 ELECTROCARDIOGRAM COMPLETE: CPT

## 2024-02-22 PROCEDURE — 99214 OFFICE O/P EST MOD 30 MIN: CPT | Mod: 25

## 2024-02-22 RX ORDER — APIXABAN 5 MG/1
5 TABLET, FILM COATED ORAL
Qty: 180 | Refills: 3 | Status: DISCONTINUED | COMMUNITY
Start: 2022-03-10 | End: 2024-02-22

## 2024-02-22 RX ORDER — OMEPRAZOLE MAGNESIUM 20 MG/1
20 CAPSULE, DELAYED RELEASE ORAL
Refills: 0 | Status: DISCONTINUED | COMMUNITY
End: 2024-02-22

## 2024-02-22 RX ORDER — RIVAROXABAN 20 MG/1
20 TABLET, FILM COATED ORAL
Qty: 1 | Refills: 3 | Status: DISCONTINUED | COMMUNITY
Start: 2022-12-05 | End: 2024-02-22

## 2024-02-22 RX ORDER — ATORVASTATIN CALCIUM 20 MG/1
20 TABLET, FILM COATED ORAL DAILY
Refills: 0 | Status: ACTIVE | COMMUNITY

## 2024-02-22 RX ORDER — LEVOTHYROXINE SODIUM 150 UG/1
150 TABLET ORAL DAILY
Refills: 0 | Status: DISCONTINUED | COMMUNITY
End: 2024-02-22

## 2024-02-22 RX ORDER — CHOLECALCIFEROL (VITAMIN D3) 25 MCG
25 MCG TABLET ORAL DAILY
Refills: 0 | Status: DISCONTINUED | COMMUNITY
End: 2024-02-22

## 2024-02-22 RX ORDER — PODOFILOX 5 MG/ML
0.5 SOLUTION TOPICAL TWICE DAILY
Qty: 15 | Refills: 1 | Status: DISCONTINUED | COMMUNITY
Start: 2020-02-21 | End: 2024-02-22

## 2024-02-22 RX ORDER — LEVOTHYROXINE SODIUM 137 UG/1
TABLET ORAL
Refills: 0 | Status: DISCONTINUED | COMMUNITY
End: 2024-02-22

## 2024-02-22 RX ORDER — PRASTERONE (DHEA) 50 MG
CAPSULE ORAL
Refills: 0 | Status: DISCONTINUED | COMMUNITY
End: 2024-02-22

## 2024-02-22 RX ORDER — FAMOTIDINE 40 MG/1
40 TABLET, FILM COATED ORAL DAILY
Refills: 0 | Status: ACTIVE | COMMUNITY

## 2024-02-22 RX ORDER — ASPIRIN 81 MG
81 TABLET, DELAYED RELEASE (ENTERIC COATED) ORAL
Refills: 0 | Status: DISCONTINUED | COMMUNITY
End: 2024-02-22

## 2024-02-22 RX ORDER — METHYLPREDNISOLONE 4 MG/1
4 TABLET ORAL
Qty: 1 | Refills: 2 | Status: DISCONTINUED | COMMUNITY
Start: 2020-01-28 | End: 2024-02-22

## 2024-02-22 RX ORDER — PANTOPRAZOLE 40 MG/1
40 TABLET, DELAYED RELEASE ORAL DAILY
Refills: 0 | Status: ACTIVE | COMMUNITY

## 2024-02-22 RX ORDER — PODOFILOX 5 MG/G
0.5 GEL TOPICAL TWICE DAILY
Qty: 18 | Refills: 2 | Status: DISCONTINUED | COMMUNITY
Start: 2020-02-19 | End: 2024-02-22

## 2024-02-22 RX ORDER — PREGNENOLONE
POWDER (GRAM) MISCELLANEOUS
Refills: 0 | Status: DISCONTINUED | COMMUNITY
End: 2024-02-22

## 2024-02-22 RX ORDER — PREGABALIN 75 MG/1
75 CAPSULE ORAL
Qty: 100 | Refills: 1 | Status: DISCONTINUED | COMMUNITY
Start: 2023-10-03 | End: 2024-02-22

## 2024-02-22 NOTE — ASSESSMENT
[FreeTextEntry1] : --- 59 yo with HLD, DM, dilated aortic root (4.4 cm), hx of PE on apixaban presents for follow up.  HLD - continue atorvastatin 20   Dilated aortic root - repeat TTE  RTC in 6m

## 2024-02-22 NOTE — HISTORY OF PRESENT ILLNESS
[FreeTextEntry1] : 57 yo with HLD, DM, dilated aortic root (4.4 cm), hx of PE on apixaban presents for follow up.  Since his last visit he has been doing well. He has lost about 20 pounds with diet and exercise. He reports no chest pain, shortness of breath, palpitations, lightheadedness, syncope, orthopnea, JOHANNE or PND.  Stress echo 2023: no ischemia at 74% MPHR. Normal LV function. Aortic root 4.4 cm. Labs 2023: , LDL 70

## 2024-02-22 NOTE — PHYSICAL EXAM
[Well Developed] : well developed [Well Nourished] : well nourished [Normal Conjunctiva] : normal conjunctiva [No Acute Distress] : no acute distress [Normal Venous Pressure] : normal venous pressure [No Carotid Bruit] : no carotid bruit [Normal S1, S2] : normal S1, S2 [No Murmur] : no murmur [No Rub] : no rub [No Gallop] : no gallop [Good Air Entry] : good air entry [Clear Lung Fields] : clear lung fields [No Respiratory Distress] : no respiratory distress  [Soft] : abdomen soft [No Masses/organomegaly] : no masses/organomegaly [Non Tender] : non-tender [Normal Gait] : normal gait [Normal Bowel Sounds] : normal bowel sounds [No Edema] : no edema [No Cyanosis] : no cyanosis [No Clubbing] : no clubbing [Moves all extremities] : moves all extremities [No Focal Deficits] : no focal deficits [Normal Speech] : normal speech [Alert and Oriented] : alert and oriented [Normal memory] : normal memory

## 2024-03-07 ENCOUNTER — APPOINTMENT (OUTPATIENT)
Dept: CARDIOLOGY | Facility: CLINIC | Age: 59
End: 2024-03-07
Payer: MEDICARE

## 2024-03-07 PROCEDURE — 93306 TTE W/DOPPLER COMPLETE: CPT

## 2024-04-09 ENCOUNTER — APPOINTMENT (OUTPATIENT)
Dept: PULMONOLOGY | Facility: CLINIC | Age: 59
End: 2024-04-09
Payer: MEDICARE

## 2024-04-09 VITALS
DIASTOLIC BLOOD PRESSURE: 70 MMHG | SYSTOLIC BLOOD PRESSURE: 110 MMHG | HEART RATE: 75 BPM | BODY MASS INDEX: 28.98 KG/M2 | WEIGHT: 238 LBS | OXYGEN SATURATION: 97 % | HEIGHT: 76 IN | RESPIRATION RATE: 14 BRPM

## 2024-04-09 DIAGNOSIS — G47.33 OBSTRUCTIVE SLEEP APNEA (ADULT) (PEDIATRIC): ICD-10-CM

## 2024-04-09 DIAGNOSIS — J45.909 UNSPECIFIED ASTHMA, UNCOMPLICATED: ICD-10-CM

## 2024-04-09 DIAGNOSIS — G47.19 OTHER HYPERSOMNIA: ICD-10-CM

## 2024-04-09 PROCEDURE — 99213 OFFICE O/P EST LOW 20 MIN: CPT

## 2024-04-09 PROCEDURE — G2211 COMPLEX E/M VISIT ADD ON: CPT

## 2024-04-09 NOTE — DISCUSSION/SUMMARY
[FreeTextEntry1] : RECURRENT ASPIRATION SP RECENT ADMISSION ADMITTED IN TENESSE GASTRIC BYPASS/ GERD MILD INTERMITTENT ASTHMA WEIGHT LOSS LIFELONG ELIQUIS ( RECURRENT DVT) ARA COMPLIANT AND BENEFITING

## 2024-04-09 NOTE — HISTORY OF PRESENT ILLNESS
[TextBox_4] : SP RECENT ADMSSION FOR ASPIRATION PNA, HH SOB ON EXERTION BETTER  ON ELIQUIS  ARA COMPLIANT AND BENEFITING AS NEEDED RESCUE INHALERS FOLLOWED BY CARDIO

## 2024-04-09 NOTE — REASON FOR VISIT
[Follow-Up] : a follow-up visit [Sleep Apnea] : sleep apnea [Cough] : cough [Pulmonary Embolism] : pulmonary embolism [Shortness of Breath] : shortness of breath [Wheezing] : wheezing

## 2024-04-17 ENCOUNTER — APPOINTMENT (OUTPATIENT)
Dept: NEUROLOGY | Facility: CLINIC | Age: 59
End: 2024-04-17
Payer: MEDICARE

## 2024-04-17 VITALS
BODY MASS INDEX: 29.22 KG/M2 | WEIGHT: 235 LBS | DIASTOLIC BLOOD PRESSURE: 74 MMHG | HEIGHT: 75 IN | HEART RATE: 85 BPM | SYSTOLIC BLOOD PRESSURE: 122 MMHG

## 2024-04-17 DIAGNOSIS — G62.9 POLYNEUROPATHY, UNSPECIFIED: ICD-10-CM

## 2024-04-17 PROCEDURE — 99213 OFFICE O/P EST LOW 20 MIN: CPT

## 2024-04-17 NOTE — ASSESSMENT
[FreeTextEntry1] : 58 year old male with polyneuropathy. Today we reviewed his EMG testing of the upper extremities which now show diffuse sensory motor polyneuropathy bilaterally. We discussed the importance of DM control, BP management as well as proper diet and exercise in the setting of neuropathy especially given his additional underlying comorbidities.  Patient will continue his regimen as is without change, will f/u with his PCP, cardiologist, Pulmonologist and additional providers and will return to the office in 6 months for follow up. . Patient is aware that they may call/ contact the office at any time if they have any additional questions or concerns regarding their management. All potential risks, benefits, side effects and interactions of any medications prescribed where discussed in detail with patient at the time of todays encounter. Supervising Physician : Billy Mohr MD

## 2024-04-17 NOTE — HISTORY OF PRESENT ILLNESS
[FreeTextEntry1] : ORIGINAL PRESENTATION: Patient is a 58 year old male who presents for initial neurology consultation with chief complaints of neuropathy. He has a long history of numbness in both lower extremities, right worse than left. He underwent an EMGLE 2011. He denies any back pain currently. He feels a "deadness" sensation in right upper leg. His diabetes is controlled with gastric bypass and diet. A1C is 5.7. He did try gabapentin 100MG daily with no relief of side effects. He did get relief with Lyrica. He stopped taking this due to insurance purposes. He did get relief with acupuncture.   Diagnostic Testing :   EMG Upper Extremities 2.14.24 : Diffuse sensori-motor polyneuropathy in bilateral upper extremities.   EMG Upper Extremities 2.20.20 : Mild right carpal tunnel syndrome and moderate left carpal tunnel syndrome possible early left ulnar neuropathy.   TODAY: I had the pleasure of seeing Mr. Shell today in follow up. His previous history and physical findings have been reviewed.   Patient remains under our care for polyneuropathy. This is a chronic condition which he is receiving active treatment for. Today we reviewed his EMG testing of the upper extremities completed 2.14.24 to compare to his prior study in 2020, results are noted above. For management of his neuropathic pain, patient has been on a medication regimen of Lyrica 200mg 1 cap BID which he states reduces the intensity of his neuropathic discomfort without adverse side effects. Today patient denies any significant changes with respect to this condition as well as any new or progressive symptoms regarding his neuropathy and wishes to continue his current regimen as is without change.

## 2024-05-31 RX ORDER — PREGABALIN 200 MG/1
200 CAPSULE ORAL TWICE DAILY
Qty: 60 | Refills: 0 | Status: ACTIVE | COMMUNITY
Start: 2024-01-23 | End: 1900-01-01

## 2024-07-18 ENCOUNTER — APPOINTMENT (OUTPATIENT)
Dept: VASCULAR SURGERY | Facility: CLINIC | Age: 59
End: 2024-07-18
Payer: MEDICARE

## 2024-07-18 VITALS
DIASTOLIC BLOOD PRESSURE: 72 MMHG | WEIGHT: 235 LBS | SYSTOLIC BLOOD PRESSURE: 126 MMHG | HEIGHT: 75 IN | BODY MASS INDEX: 29.22 KG/M2

## 2024-07-18 DIAGNOSIS — M79.89 OTHER SPECIFIED SOFT TISSUE DISORDERS: ICD-10-CM

## 2024-07-18 PROCEDURE — 99213 OFFICE O/P EST LOW 20 MIN: CPT

## 2024-07-18 PROCEDURE — 93971 EXTREMITY STUDY: CPT

## 2024-07-30 ENCOUNTER — APPOINTMENT (OUTPATIENT)
Dept: PULMONOLOGY | Facility: CLINIC | Age: 59
End: 2024-07-30

## 2024-08-27 ENCOUNTER — APPOINTMENT (OUTPATIENT)
Dept: CARDIOLOGY | Facility: CLINIC | Age: 59
End: 2024-08-27

## 2024-08-30 ENCOUNTER — APPOINTMENT (OUTPATIENT)
Dept: CARDIOLOGY | Facility: CLINIC | Age: 59
End: 2024-08-30
Payer: MEDICARE

## 2024-08-30 VITALS
DIASTOLIC BLOOD PRESSURE: 72 MMHG | HEART RATE: 62 BPM | BODY MASS INDEX: 28.97 KG/M2 | HEIGHT: 75 IN | WEIGHT: 233 LBS | SYSTOLIC BLOOD PRESSURE: 110 MMHG

## 2024-08-30 DIAGNOSIS — I71.20 THORACIC AORTIC ANEURYSM, WITHOUT RUPTURE, UNSPECIFIED: ICD-10-CM

## 2024-08-30 DIAGNOSIS — I49.3 VENTRICULAR PREMATURE DEPOLARIZATION: ICD-10-CM

## 2024-08-30 DIAGNOSIS — E78.5 HYPERLIPIDEMIA, UNSPECIFIED: ICD-10-CM

## 2024-08-30 DIAGNOSIS — G47.33 OBSTRUCTIVE SLEEP APNEA (ADULT) (PEDIATRIC): ICD-10-CM

## 2024-08-30 PROCEDURE — 93000 ELECTROCARDIOGRAM COMPLETE: CPT

## 2024-08-30 PROCEDURE — 99214 OFFICE O/P EST MOD 30 MIN: CPT | Mod: 25

## 2024-08-30 RX ORDER — ERGOCALCIFEROL 1.25 MG/1
1.25 MG CAPSULE, LIQUID FILLED ORAL
Refills: 0 | Status: ACTIVE | COMMUNITY

## 2024-08-30 NOTE — CARDIOLOGY SUMMARY
[de-identified] : 8/30/24: SR 62bpm, no ST changes.  [de-identified] : NM Nuclear Stress 4/15/22: Negative stress test for exercise induced ischemia with respect to symptoms at a moderate workload, Negative stress test for exercise induced ischemia with respect to electrocardiographic changes at a moderate workload, Myocardial imaging reveals no evidence of ischemia nor infarction 4. Gated imaging reveals normal wall motion thickening and ejection fraction

## 2024-08-30 NOTE — HISTORY OF PRESENT ILLNESS
[FreeTextEntry1] : 59 yo with HLD, DM, dilated aortic root (4.4 cm), hx of PE on eliquis, DVT, bipolar, GERD, asthma, ARA (not on CPAP) reports losing about 60lbs, bipolar   Patient presents for follow up visit, previously seen by MD Arevalo and MD Asencio. Denies CP, SOB, palpitations, dizziness. Reports LE swelling  with long drives.  SHEILA 3/7/24: No ischemia at 74% MPHR. LVEF 57%. Aortic root 4.4 cm, mild DE   7/17/24:   HDL 47 Trig 72 LDL 91  A1c 5.9 TSH 2.16.

## 2024-08-30 NOTE — ASSESSMENT
[FreeTextEntry1] : 59 yo with HLD, DM, dilated aortic root (4.4 cm), hx of PE on eliquis, DVT, bipolar, GERD, asthma, ARA (not on CPAP) reports losing about 60lbs, bipolar   PE/DVT- On Eliquis 5mg BID. ARA- no longer uses CPAP, reports 60lb weight loss. HLD- controlled. DM- well controlled. Dilated Aortic Root (4.4cm) stable so far.  Plan: Continue treatment. Will repeat Echo after next visit. F/U in 6 months with repeat BW (ordered).  Clyde Pritchett MD

## 2024-10-16 ENCOUNTER — APPOINTMENT (OUTPATIENT)
Dept: NEUROLOGY | Facility: CLINIC | Age: 59
End: 2024-10-16

## 2024-12-10 ENCOUNTER — APPOINTMENT (OUTPATIENT)
Dept: UROLOGY | Facility: CLINIC | Age: 59
End: 2024-12-10

## 2024-12-10 VITALS — WEIGHT: 235 LBS | HEIGHT: 75 IN | BODY MASS INDEX: 29.22 KG/M2

## 2024-12-10 DIAGNOSIS — R39.9 UNSPECIFIED SYMPTOMS AND SIGNS INVOLVING THE GENITOURINARY SYSTEM: ICD-10-CM

## 2024-12-10 DIAGNOSIS — Z78.9 OTHER SPECIFIED HEALTH STATUS: ICD-10-CM

## 2024-12-10 DIAGNOSIS — N52.9 MALE ERECTILE DYSFUNCTION, UNSPECIFIED: ICD-10-CM

## 2024-12-10 DIAGNOSIS — Z80.42 FAMILY HISTORY OF MALIGNANT NEOPLASM OF PROSTATE: ICD-10-CM

## 2024-12-10 LAB
BILIRUB UR QL STRIP: NORMAL
COLLECTION METHOD: NORMAL
GLUCOSE UR-MCNC: 250
HCG UR QL: 0.2 EU/DL
HGB UR QL STRIP.AUTO: NORMAL
KETONES UR-MCNC: NORMAL
LEUKOCYTE ESTERASE UR QL STRIP: NORMAL
NITRITE UR QL STRIP: NORMAL
PH UR STRIP: 5.5
PROT UR STRIP-MCNC: NORMAL
SP GR UR STRIP: 1.02

## 2024-12-10 PROCEDURE — 81003 URINALYSIS AUTO W/O SCOPE: CPT | Mod: QW

## 2024-12-10 PROCEDURE — 99204 OFFICE O/P NEW MOD 45 MIN: CPT

## 2025-01-09 ENCOUNTER — LABORATORY RESULT (OUTPATIENT)
Age: 60
End: 2025-01-09

## 2025-01-13 ENCOUNTER — APPOINTMENT (OUTPATIENT)
Dept: UROLOGY | Facility: CLINIC | Age: 60
End: 2025-01-13
Payer: MEDICARE

## 2025-01-13 DIAGNOSIS — N40.1 BENIGN PROSTATIC HYPERPLASIA WITH LOWER URINARY TRACT SYMPMS: ICD-10-CM

## 2025-01-13 DIAGNOSIS — N13.8 BENIGN PROSTATIC HYPERPLASIA WITH LOWER URINARY TRACT SYMPMS: ICD-10-CM

## 2025-01-13 DIAGNOSIS — N43.40 SPERMATOCELE OF EPIDIDYMIS, UNSPECIFIED: ICD-10-CM

## 2025-01-13 DIAGNOSIS — R39.9 UNSPECIFIED SYMPTOMS AND SIGNS INVOLVING THE GENITOURINARY SYSTEM: ICD-10-CM

## 2025-01-13 PROCEDURE — 93975 VASCULAR STUDY: CPT | Mod: 79

## 2025-01-13 PROCEDURE — 99214 OFFICE O/P EST MOD 30 MIN: CPT

## 2025-01-13 PROCEDURE — 76870 US EXAM SCROTUM: CPT

## 2025-01-13 PROCEDURE — 76775 US EXAM ABDO BACK WALL LIM: CPT | Mod: 79

## 2025-01-13 PROCEDURE — 76857 US EXAM PELVIC LIMITED: CPT | Mod: 79

## 2025-01-16 ENCOUNTER — APPOINTMENT (OUTPATIENT)
Dept: VASCULAR SURGERY | Facility: CLINIC | Age: 60
End: 2025-01-16
Payer: MEDICARE

## 2025-01-16 VITALS
HEIGHT: 75 IN | BODY MASS INDEX: 28.6 KG/M2 | WEIGHT: 230 LBS | DIASTOLIC BLOOD PRESSURE: 88 MMHG | SYSTOLIC BLOOD PRESSURE: 121 MMHG

## 2025-01-16 DIAGNOSIS — I80.01 PHLEBITIS AND THROMBOPHLEBITIS OF SUPERFICIAL VESSELS OF RIGHT LOWER EXTREMITY: ICD-10-CM

## 2025-01-16 PROCEDURE — 99212 OFFICE O/P EST SF 10 MIN: CPT

## 2025-01-16 PROCEDURE — 93971 EXTREMITY STUDY: CPT

## 2025-01-27 ENCOUNTER — RESULT REVIEW (OUTPATIENT)
Age: 60
End: 2025-01-27

## 2025-01-27 ENCOUNTER — OUTPATIENT (OUTPATIENT)
Dept: OUTPATIENT SERVICES | Facility: HOSPITAL | Age: 60
LOS: 1 days | End: 2025-01-27
Payer: MEDICARE

## 2025-01-27 DIAGNOSIS — Z96.642 PRESENCE OF LEFT ARTIFICIAL HIP JOINT: Chronic | ICD-10-CM

## 2025-01-27 DIAGNOSIS — Z98.890 OTHER SPECIFIED POSTPROCEDURAL STATES: Chronic | ICD-10-CM

## 2025-01-27 PROCEDURE — 70553 MRI BRAIN STEM W/O & W/DYE: CPT | Mod: 26

## 2025-01-27 PROCEDURE — 70553 MRI BRAIN STEM W/O & W/DYE: CPT

## 2025-01-27 PROCEDURE — A9579: CPT

## 2025-02-24 ENCOUNTER — APPOINTMENT (OUTPATIENT)
Dept: PULMONOLOGY | Facility: CLINIC | Age: 60
End: 2025-02-24
Payer: MEDICARE

## 2025-02-24 VITALS
WEIGHT: 225 LBS | BODY MASS INDEX: 28.12 KG/M2 | RESPIRATION RATE: 14 BRPM | DIASTOLIC BLOOD PRESSURE: 70 MMHG | HEART RATE: 80 BPM | OXYGEN SATURATION: 99 % | SYSTOLIC BLOOD PRESSURE: 124 MMHG

## 2025-02-24 DIAGNOSIS — G47.33 OBSTRUCTIVE SLEEP APNEA (ADULT) (PEDIATRIC): ICD-10-CM

## 2025-02-24 DIAGNOSIS — R06.02 SHORTNESS OF BREATH: ICD-10-CM

## 2025-02-24 PROCEDURE — G2211 COMPLEX E/M VISIT ADD ON: CPT

## 2025-02-24 PROCEDURE — 99213 OFFICE O/P EST LOW 20 MIN: CPT

## 2025-03-27 ENCOUNTER — APPOINTMENT (OUTPATIENT)
Dept: UROLOGY | Facility: CLINIC | Age: 60
End: 2025-03-27
Payer: MEDICARE

## 2025-03-27 DIAGNOSIS — E03.9 HYPOTHYROIDISM, UNSPECIFIED: ICD-10-CM

## 2025-03-27 DIAGNOSIS — R79.89 OTHER SPECIFIED ABNORMAL FINDINGS OF BLOOD CHEMISTRY: ICD-10-CM

## 2025-03-27 PROCEDURE — 99214 OFFICE O/P EST MOD 30 MIN: CPT

## 2025-03-27 PROCEDURE — G2211 COMPLEX E/M VISIT ADD ON: CPT

## 2025-03-31 ENCOUNTER — NON-APPOINTMENT (OUTPATIENT)
Age: 60
End: 2025-03-31

## 2025-03-31 ENCOUNTER — APPOINTMENT (OUTPATIENT)
Dept: CARDIOLOGY | Facility: CLINIC | Age: 60
End: 2025-03-31
Payer: MEDICARE

## 2025-03-31 VITALS
HEIGHT: 75 IN | DIASTOLIC BLOOD PRESSURE: 72 MMHG | WEIGHT: 230 LBS | BODY MASS INDEX: 28.6 KG/M2 | HEART RATE: 79 BPM | SYSTOLIC BLOOD PRESSURE: 106 MMHG

## 2025-03-31 DIAGNOSIS — E78.5 HYPERLIPIDEMIA, UNSPECIFIED: ICD-10-CM

## 2025-03-31 DIAGNOSIS — I49.3 VENTRICULAR PREMATURE DEPOLARIZATION: ICD-10-CM

## 2025-03-31 DIAGNOSIS — I77.810 THORACIC AORTIC ECTASIA: ICD-10-CM

## 2025-03-31 DIAGNOSIS — I71.20 THORACIC AORTIC ANEURYSM, WITHOUT RUPTURE, UNSPECIFIED: ICD-10-CM

## 2025-03-31 DIAGNOSIS — I26.99 OTHER PULMONARY EMBOLISM W/OUT ACUTE COR PULMONALE: ICD-10-CM

## 2025-03-31 PROCEDURE — 99214 OFFICE O/P EST MOD 30 MIN: CPT | Mod: 25

## 2025-03-31 PROCEDURE — 93000 ELECTROCARDIOGRAM COMPLETE: CPT

## 2025-07-24 ENCOUNTER — APPOINTMENT (OUTPATIENT)
Dept: VASCULAR SURGERY | Facility: CLINIC | Age: 60
End: 2025-07-24

## 2025-09-08 ENCOUNTER — APPOINTMENT (OUTPATIENT)
Dept: CARDIOLOGY | Facility: CLINIC | Age: 60
End: 2025-09-08
Payer: MEDICARE

## 2025-09-08 VITALS
HEIGHT: 75 IN | DIASTOLIC BLOOD PRESSURE: 84 MMHG | SYSTOLIC BLOOD PRESSURE: 120 MMHG | WEIGHT: 233 LBS | HEART RATE: 63 BPM | BODY MASS INDEX: 28.97 KG/M2

## 2025-09-08 DIAGNOSIS — G47.33 OBSTRUCTIVE SLEEP APNEA (ADULT) (PEDIATRIC): ICD-10-CM

## 2025-09-08 DIAGNOSIS — I71.20 THORACIC AORTIC ANEURYSM, WITHOUT RUPTURE, UNSPECIFIED: ICD-10-CM

## 2025-09-08 DIAGNOSIS — E78.5 HYPERLIPIDEMIA, UNSPECIFIED: ICD-10-CM

## 2025-09-08 DIAGNOSIS — I49.3 VENTRICULAR PREMATURE DEPOLARIZATION: ICD-10-CM

## 2025-09-08 PROCEDURE — 93306 TTE W/DOPPLER COMPLETE: CPT

## 2025-09-08 PROCEDURE — 99214 OFFICE O/P EST MOD 30 MIN: CPT | Mod: 25

## 2025-09-08 PROCEDURE — 93000 ELECTROCARDIOGRAM COMPLETE: CPT
